# Patient Record
Sex: FEMALE | Race: WHITE
[De-identification: names, ages, dates, MRNs, and addresses within clinical notes are randomized per-mention and may not be internally consistent; named-entity substitution may affect disease eponyms.]

---

## 2017-06-15 PROBLEM — Z00.00 ENCOUNTER FOR PREVENTIVE HEALTH EXAMINATION: Status: ACTIVE | Noted: 2017-06-15

## 2017-06-19 ENCOUNTER — NON-APPOINTMENT (OUTPATIENT)
Age: 72
End: 2017-06-19

## 2017-06-19 ENCOUNTER — APPOINTMENT (OUTPATIENT)
Dept: INTERNAL MEDICINE | Facility: CLINIC | Age: 72
End: 2017-06-19

## 2017-06-19 ENCOUNTER — OTHER (OUTPATIENT)
Age: 72
End: 2017-06-19

## 2017-06-19 VITALS
WEIGHT: 152 LBS | HEART RATE: 82 BPM | DIASTOLIC BLOOD PRESSURE: 78 MMHG | OXYGEN SATURATION: 97 % | BODY MASS INDEX: 26.93 KG/M2 | SYSTOLIC BLOOD PRESSURE: 100 MMHG | HEIGHT: 63 IN | TEMPERATURE: 98.6 F | RESPIRATION RATE: 16 BRPM

## 2017-06-19 DIAGNOSIS — M54.30 SCIATICA, UNSPECIFIED SIDE: ICD-10-CM

## 2017-06-19 DIAGNOSIS — J98.01 ACUTE BRONCHOSPASM: ICD-10-CM

## 2017-06-19 RX ORDER — ASPIRIN 81 MG
81 TABLET, DELAYED RELEASE (ENTERIC COATED) ORAL
Refills: 0 | Status: ACTIVE | COMMUNITY

## 2017-06-20 ENCOUNTER — LABORATORY RESULT (OUTPATIENT)
Age: 72
End: 2017-06-20

## 2017-06-23 ENCOUNTER — OTHER (OUTPATIENT)
Age: 72
End: 2017-06-23

## 2017-07-19 LAB
ALBUMIN SERPL ELPH-MCNC: 4.1 G/DL
ALP BLD-CCNC: 59 U/L
ALT SERPL-CCNC: 27 U/L
ANION GAP SERPL CALC-SCNC: 14 MMOL/L
AST SERPL-CCNC: 26 U/L
BILIRUB DIRECT SERPL-MCNC: 0.1 MG/DL
BILIRUB INDIRECT SERPL-MCNC: 0.1 MG/DL
BILIRUB SERPL-MCNC: 0.2 MG/DL
BUN SERPL-MCNC: 21 MG/DL
CALCIUM SERPL-MCNC: 9.6 MG/DL
CHLORIDE SERPL-SCNC: 105 MMOL/L
CHOLEST SERPL-MCNC: 165 MG/DL
CHOLEST/HDLC SERPL: 3.4 RATIO
CO2 SERPL-SCNC: 27 MMOL/L
CREAT SERPL-MCNC: 1.08 MG/DL
ESTIMATED AVERAGE GLUCOSE: 131 MG/DL
GLUCOSE SERPL-MCNC: 109 MG/DL
HBA1C MFR BLD HPLC: 6.2 %
HDLC SERPL-MCNC: 49 MG/DL
LDLC SERPL CALC-MCNC: 90 MG/DL
POTASSIUM SERPL-SCNC: 5 MMOL/L
PROT SERPL-MCNC: 7.1 G/DL
SODIUM SERPL-SCNC: 146 MMOL/L
T3FREE SERPL-MCNC: 2.61 PG/ML
T3RU NFR SERPL: 1.11 INDEX
T4 FREE SERPL-MCNC: 0.8 NG/DL
T4 SERPL-MCNC: 4.7 UG/DL
TRIGL SERPL-MCNC: 132 MG/DL
TSH SERPL-ACNC: 6.16 UIU/ML

## 2017-11-11 ENCOUNTER — LABORATORY RESULT (OUTPATIENT)
Age: 72
End: 2017-11-11

## 2017-12-19 ENCOUNTER — APPOINTMENT (OUTPATIENT)
Dept: INTERNAL MEDICINE | Facility: CLINIC | Age: 72
End: 2017-12-19
Payer: MEDICARE

## 2017-12-19 VITALS
SYSTOLIC BLOOD PRESSURE: 146 MMHG | HEART RATE: 86 BPM | BODY MASS INDEX: 27.29 KG/M2 | WEIGHT: 154 LBS | TEMPERATURE: 98.1 F | HEIGHT: 63 IN | DIASTOLIC BLOOD PRESSURE: 86 MMHG | OXYGEN SATURATION: 95 % | RESPIRATION RATE: 16 BRPM

## 2017-12-19 DIAGNOSIS — M19.90 UNSPECIFIED OSTEOARTHRITIS, UNSPECIFIED SITE: ICD-10-CM

## 2017-12-19 PROCEDURE — 99214 OFFICE O/P EST MOD 30 MIN: CPT

## 2017-12-19 RX ORDER — SIMVASTATIN 20 MG/1
20 TABLET, FILM COATED ORAL DAILY
Qty: 90 | Refills: 3 | Status: ACTIVE | COMMUNITY
Start: 1900-01-01 | End: 1900-01-01

## 2018-06-19 ENCOUNTER — APPOINTMENT (OUTPATIENT)
Dept: INTERNAL MEDICINE | Facility: CLINIC | Age: 73
End: 2018-06-19
Payer: MEDICARE

## 2018-06-19 VITALS
HEIGHT: 63 IN | OXYGEN SATURATION: 95 % | BODY MASS INDEX: 26.22 KG/M2 | WEIGHT: 148 LBS | DIASTOLIC BLOOD PRESSURE: 82 MMHG | HEART RATE: 88 BPM | SYSTOLIC BLOOD PRESSURE: 134 MMHG | RESPIRATION RATE: 16 BRPM | TEMPERATURE: 98.5 F

## 2018-06-19 DIAGNOSIS — I87.8 OTHER SPECIFIED DISORDERS OF VEINS: ICD-10-CM

## 2018-06-19 DIAGNOSIS — Z78.9 OTHER SPECIFIED HEALTH STATUS: ICD-10-CM

## 2018-06-19 DIAGNOSIS — Z80.42 FAMILY HISTORY OF MALIGNANT NEOPLASM OF PROSTATE: ICD-10-CM

## 2018-06-19 PROCEDURE — G0439: CPT

## 2018-06-19 NOTE — PLAN
[FreeTextEntry1] : 1. Continue with medication as outlined above.\par \par 2. At this time we'll continue to monitor her subclinical hypothyroidism, as she remains totally asymptomatic. She does not want treatment at this time.\par \par 3. Trial of compressive stockings for the edema in her lower extremity on the right. She may have underlying venous insufficiency\par \par 4. The patient will contact the office in July to assess the availability of the shingles vaccine. She will also receive a Prevnar 13 vaccine at the same time.\par \par 5. Followup in 6 months with EKG, routine fasting blood work, and flu shot. A second shingles vaccine will be administered at that time.\par \par 6. GYN followup is scheduled for December.

## 2018-06-19 NOTE — HEALTH RISK ASSESSMENT
[Excellent] : ~his/her~  mood as  excellent [No falls in past year] : Patient reported no falls in the past year [0] : 2) Feeling down, depressed, or hopeless: Not at all (0) [None] : None [With Significant Other] : lives with significant other [Retired] : retired [College] : College [] :  [# Of Children ___] : has [unfilled] children [Feels Safe at Home] : Feels safe at home [Fully functional (bathing, dressing, toileting, transferring, walking, feeding)] : Fully functional (bathing, dressing, toileting, transferring, walking, feeding) [Fully functional (using the telephone, shopping, preparing meals, housekeeping, doing laundry, using] : Fully functional and needs no help or supervision to perform IADLs (using the telephone, shopping, preparing meals, housekeeping, doing laundry, using transportation, managing medications and managing finances) [Smoke Detector] : smoke detector [Carbon Monoxide Detector] : carbon monoxide detector [Seat Belt] :  uses seat belt [Sunscreen] : uses sunscreen [] : No [de-identified] : no [de-identified] : no [Change in mental status noted] : No change in mental status noted [Language] : denies difficulty with language [Behavior] : denies difficulty with behavior [Learning/Retaining New Information] : denies difficulty learning/retaining new information [Handling Complex Tasks] : denies difficulty handling complex tasks [Reasoning] : denies difficulty with reasoning [Spatial Ability and Orientation] : denies difficulty with spatial ability and orientation [Sexually Active] : not sexually active [High Risk Behavior] : no high risk behavior [Reports changes in hearing] : Reports no changes in hearing [Reports changes in vision] : Reports no changes in vision [Reports changes in dental health] : Reports no changes in dental health [Guns at Home] : no guns at home [Safety elements used in home] : no safety elements used in home [Travel to Developing Areas] : does not  travel to developing areas [TB Exposure] : is not being exposed to tuberculosis [Caregiver Concerns] : does not have caregiver concerns [MammogramDate] : na [PapSmearDate] : na [BoneDensityDate] : na [ColonoscopyDate] : na

## 2018-06-19 NOTE — PHYSICAL EXAM
[General Appearance - Alert] : alert [General Appearance - In No Acute Distress] : in no acute distress [Sclera] : the sclera and conjunctiva were normal [PERRL With Normal Accommodation] : pupils were equal in size, round, and reactive to light [Extraocular Movements] : extraocular movements were intact [Outer Ear] : the ears and nose were normal in appearance [Oropharynx] : the oropharynx was normal [Neck Appearance] : the appearance of the neck was normal [Neck Cervical Mass (___cm)] : no neck mass was observed [Jugular Venous Distention Increased] : there was no jugular-venous distention [Thyroid Diffuse Enlargement] : the thyroid was not enlarged [Thyroid Nodule] : there were no palpable thyroid nodules [Auscultation Breath Sounds / Voice Sounds] : lungs were clear to auscultation bilaterally [Heart Rate And Rhythm] : heart rate was normal and rhythm regular [Heart Sounds] : normal S1 and S2 [Heart Sounds Gallop] : no gallops [Murmurs] : no murmurs [Heart Sounds Pericardial Friction Rub] : no pericardial rub [Arterial Pulses Carotid] : carotid pulses were normal with no bruits [Edema] : there was no peripheral edema [Bowel Sounds] : normal bowel sounds [Abdomen Soft] : soft [Abdomen Tenderness] : non-tender [] : no hepato-splenomegaly [Abdomen Mass (___ Cm)] : no abdominal mass palpated [Cervical Lymph Nodes Enlarged Posterior Bilaterally] : posterior cervical [Cervical Lymph Nodes Enlarged Anterior Bilaterally] : anterior cervical [Supraclavicular Lymph Nodes Enlarged Bilaterally] : supraclavicular [No CVA Tenderness] : no ~M costovertebral angle tenderness [Nail Clubbing] : no clubbing  or cyanosis of the fingernails [FreeTextEntry1] : Several hyperpigmented nevi are noted

## 2018-06-19 NOTE — REVIEW OF SYSTEMS
[Joint Swelling] : joint swelling [Negative] : Heme/Lymph [FreeTextEntry9] : Slight swelling of the right ankle

## 2018-06-19 NOTE — HISTORY OF PRESENT ILLNESS
[de-identified] : The patient comes in today for a wellness evaluation.\par \par  Overall, the patient is doing quite well at this time. She states that she "feels great." She has been walking 4 miles per day as part of her exercise regimen. She denies any change in her bowel habits. She has still not yet undergone a colonoscopy. She is scheduled for mammography next week.\par \par The patient was recently evaluated by her cardiologist, Dr. Bell, and carotid duplex studies were performed. Stable from a cardiac standpoint. She denies any other constitutional symptoms at this time such as fevers chills night sweats cough or sputum production. She now comes in for this assessment.

## 2018-08-28 ENCOUNTER — MED ADMIN CHARGE (OUTPATIENT)
Age: 73
End: 2018-08-28

## 2018-08-28 ENCOUNTER — APPOINTMENT (OUTPATIENT)
Dept: INTERNAL MEDICINE | Facility: CLINIC | Age: 73
End: 2018-08-28
Payer: MEDICARE

## 2018-08-28 PROCEDURE — 90750 HZV VACC RECOMBINANT IM: CPT | Mod: GY,GA

## 2018-08-28 PROCEDURE — 90471 IMMUNIZATION ADMIN: CPT | Mod: GY

## 2018-11-13 ENCOUNTER — APPOINTMENT (OUTPATIENT)
Dept: INTERNAL MEDICINE | Facility: CLINIC | Age: 73
End: 2018-11-13
Payer: MEDICARE

## 2018-11-13 ENCOUNTER — MED ADMIN CHARGE (OUTPATIENT)
Age: 73
End: 2018-11-13

## 2018-11-13 ENCOUNTER — RX RENEWAL (OUTPATIENT)
Age: 73
End: 2018-11-13

## 2018-11-13 PROCEDURE — G0009: CPT

## 2018-11-13 PROCEDURE — 90670 PCV13 VACCINE IM: CPT

## 2018-12-11 ENCOUNTER — APPOINTMENT (OUTPATIENT)
Dept: INTERNAL MEDICINE | Facility: CLINIC | Age: 73
End: 2018-12-11
Payer: MEDICARE

## 2018-12-11 VITALS
OXYGEN SATURATION: 96 % | BODY MASS INDEX: 24.27 KG/M2 | WEIGHT: 137 LBS | TEMPERATURE: 97.8 F | RESPIRATION RATE: 18 BRPM | SYSTOLIC BLOOD PRESSURE: 104 MMHG | HEIGHT: 63 IN | DIASTOLIC BLOOD PRESSURE: 76 MMHG | HEART RATE: 66 BPM

## 2018-12-11 DIAGNOSIS — J32.9 CHRONIC SINUSITIS, UNSPECIFIED: ICD-10-CM

## 2018-12-11 DIAGNOSIS — I70.90 UNSPECIFIED ATHEROSCLEROSIS: ICD-10-CM

## 2018-12-11 PROCEDURE — 99214 OFFICE O/P EST MOD 30 MIN: CPT

## 2018-12-11 NOTE — HISTORY OF PRESENT ILLNESS
[de-identified] : The patient comes in today for a followup evaluation. There are several issues to discuss.\par \par Overall, the patient states she is doing well. Monitor her subclinical hypothyroidism. She notes that her energy levels are good. Her appetite is good. She denies having any anxiety or fatigue. The patient has been evaluated by her cardiologist. She is stable from a cardiac standpoint. She continues on simvastatin.\par \par The patient exercises at least 5 days a week by walking. She has lost 11 pounds since the last visit. She has cut back on her portions. She is attempting to be compliant due to the fact that there is a family history of type 2 diabetes.\par \par She denies any sinus type symptoms. The remainder of her review of systems is otherwise noncontributory. She now comes in for this assessment.

## 2018-12-11 NOTE — PLAN
[FreeTextEntry1] : 1. Continued medication as outlined above.\par \par 2. Yearly routine fasting blood work to be performed at this time.\par \par 3. Routine GYN followup in January.\par \par 4. The second shingles vaccine booster will need to be obtained in the near future.\par \par 5. We'll need to discuss followup colonoscopy\par \par 6. Follow up with myself in 6 months with a wellness evaluation. She'll undergo lipid liver profile basic metabolic panel and hemoglobin A1c several days prior to that visit. We'll also update TDaP.

## 2018-12-11 NOTE — PHYSICAL EXAM
[General Appearance - Alert] : alert [General Appearance - In No Acute Distress] : in no acute distress [Outer Ear] : the ears and nose were normal in appearance [Oropharynx] : the oropharynx was normal [Neck Appearance] : the appearance of the neck was normal [Neck Cervical Mass (___cm)] : no neck mass was observed [Jugular Venous Distention Increased] : there was no jugular-venous distention [Thyroid Diffuse Enlargement] : the thyroid was not enlarged [Thyroid Nodule] : there were no palpable thyroid nodules [Auscultation Breath Sounds / Voice Sounds] : lungs were clear to auscultation bilaterally [Heart Rate And Rhythm] : heart rate was normal and rhythm regular [Heart Sounds] : normal S1 and S2 [Heart Sounds Gallop] : no gallops [Murmurs] : no murmurs [Heart Sounds Pericardial Friction Rub] : no pericardial rub [Arterial Pulses Carotid] : carotid pulses were normal with no bruits [Edema] : there was no peripheral edema [Bowel Sounds] : normal bowel sounds [Abdomen Soft] : soft [Abdomen Tenderness] : non-tender [] : no hepato-splenomegaly [Abdomen Mass (___ Cm)] : no abdominal mass palpated [Cervical Lymph Nodes Enlarged Posterior Bilaterally] : posterior cervical [Cervical Lymph Nodes Enlarged Anterior Bilaterally] : anterior cervical [Supraclavicular Lymph Nodes Enlarged Bilaterally] : supraclavicular [FreeTextEntry1] : Several hyperpigmented nevi are noted

## 2018-12-14 ENCOUNTER — LABORATORY RESULT (OUTPATIENT)
Age: 73
End: 2018-12-14

## 2018-12-17 LAB
24R-OH-CALCIDIOL SERPL-MCNC: 28.2 PG/ML
ALBUMIN SERPL ELPH-MCNC: 4.4 G/DL
ALP BLD-CCNC: 57 U/L
ALT SERPL-CCNC: 31 U/L
ANION GAP SERPL CALC-SCNC: 10 MMOL/L
APPEARANCE: CLEAR
AST SERPL-CCNC: 29 U/L
BACTERIA: NEGATIVE
BASOPHILS # BLD AUTO: 0.02 K/UL
BASOPHILS NFR BLD AUTO: 0.4 %
BILIRUB SERPL-MCNC: <0.2 MG/DL
BILIRUBIN URINE: NEGATIVE
BLOOD URINE: NEGATIVE
BUN SERPL-MCNC: 28 MG/DL
CALCIUM SERPL-MCNC: 9.3 MG/DL
CHLORIDE SERPL-SCNC: 107 MMOL/L
CHOLEST SERPL-MCNC: 138 MG/DL
CHOLEST/HDLC SERPL: 3 RATIO
CO2 SERPL-SCNC: 27 MMOL/L
COLOR: YELLOW
CREAT SERPL-MCNC: 0.95 MG/DL
EOSINOPHIL # BLD AUTO: 0.27 K/UL
EOSINOPHIL NFR BLD AUTO: 5 %
ESTIMATED AVERAGE GLUCOSE: 126 MG/DL
GLUCOSE QUALITATIVE U: NEGATIVE MG/DL
GLUCOSE SERPL-MCNC: 104 MG/DL
HBA1C MFR BLD HPLC: 6 %
HCT VFR BLD CALC: 39.8 %
HDLC SERPL-MCNC: 46 MG/DL
HGB BLD-MCNC: 12.6 G/DL
HYALINE CASTS: 1 /LPF
IMM GRANULOCYTES NFR BLD AUTO: 0.2 %
KETONES URINE: NEGATIVE
LDLC SERPL CALC-MCNC: 74 MG/DL
LEUKOCYTE ESTERASE URINE: ABNORMAL
LYMPHOCYTES # BLD AUTO: 2.46 K/UL
LYMPHOCYTES NFR BLD AUTO: 45.9 %
MAN DIFF?: NORMAL
MCHC RBC-ENTMCNC: 28.3 PG
MCHC RBC-ENTMCNC: 31.7 GM/DL
MCV RBC AUTO: 89.4 FL
MICROSCOPIC-UA: NORMAL
MONOCYTES # BLD AUTO: 0.5 K/UL
MONOCYTES NFR BLD AUTO: 9.3 %
NEUTROPHILS # BLD AUTO: 2.1 K/UL
NEUTROPHILS NFR BLD AUTO: 39.2 %
NITRITE URINE: NEGATIVE
PH URINE: 5
PLATELET # BLD AUTO: 273 K/UL
POTASSIUM SERPL-SCNC: 4.4 MMOL/L
PROT SERPL-MCNC: 6.8 G/DL
PROTEIN URINE: NEGATIVE MG/DL
RBC # BLD: 4.45 M/UL
RBC # FLD: 13.6 %
RED BLOOD CELLS URINE: 1 /HPF
SODIUM SERPL-SCNC: 144 MMOL/L
SPECIFIC GRAVITY URINE: 1.02
SQUAMOUS EPITHELIAL CELLS: 1 /HPF
T3FREE SERPL-MCNC: 2.45 PG/ML
T3RU NFR SERPL: 1.16 INDEX
T4 FREE SERPL-MCNC: 0.8 NG/DL
T4 SERPL-MCNC: 4.5 UG/DL
TRIGL SERPL-MCNC: 90 MG/DL
TSH SERPL-ACNC: 12.59 UIU/ML
UROBILINOGEN URINE: NEGATIVE MG/DL
WBC # FLD AUTO: 5.36 K/UL
WHITE BLOOD CELLS URINE: 3 /HPF

## 2019-01-04 ENCOUNTER — APPOINTMENT (OUTPATIENT)
Dept: INTERNAL MEDICINE | Facility: CLINIC | Age: 74
End: 2019-01-04
Payer: MEDICARE

## 2019-01-04 VITALS
TEMPERATURE: 97.5 F | WEIGHT: 138 LBS | HEIGHT: 63 IN | SYSTOLIC BLOOD PRESSURE: 118 MMHG | HEART RATE: 80 BPM | RESPIRATION RATE: 16 BRPM | BODY MASS INDEX: 24.45 KG/M2 | DIASTOLIC BLOOD PRESSURE: 64 MMHG | OXYGEN SATURATION: 99 %

## 2019-01-04 PROCEDURE — 99213 OFFICE O/P EST LOW 20 MIN: CPT

## 2019-01-04 NOTE — PHYSICAL EXAM
[Normal Outer Ear/Nose] : the outer ears and nose were normal in appearance [Normal Oropharynx] : the oropharynx was normal [Normal TMs] : both tympanic membranes were normal [Supple] : supple [No Lymphadenopathy] : no lymphadenopathy [No Respiratory Distress] : no respiratory distress  [Clear to Auscultation] : lungs were clear to auscultation bilaterally [No Accessory Muscle Use] : no accessory muscle use [Normal Rate] : normal rate  [Regular Rhythm] : with a regular rhythm [Normal S1, S2] : normal S1 and S2 [Pedal Pulses Present] : the pedal pulses are present [No Extremity Clubbing/Cyanosis] : no extremity clubbing/cyanosis [Normal Posterior Cervical Nodes] : no posterior cervical lymphadenopathy [Normal Anterior Cervical Nodes] : no anterior cervical lymphadenopathy [Normal Gait] : normal gait [Coordination Grossly Intact] : coordination grossly intact [No Focal Deficits] : no focal deficits [Normal Affect] : the affect was normal [Alert and Oriented x3] : oriented to person, place, and time [Normal Insight/Judgement] : insight and judgment were intact [Well Nourished] : well nourished [Well Developed] : well developed [No Acute Distress] : no acute distress [de-identified] : left side thoracic back with 2  superficial skin tears,  skin mildy reddened

## 2019-01-04 NOTE — REVIEW OF SYSTEMS
[Itching] : Itching [Skin Rash] : skin rash [Negative] : ENT [Fever] : no fever [Chills] : no chills [Fatigue] : no fatigue [de-identified] : see HPI

## 2019-01-04 NOTE — ASSESSMENT
[FreeTextEntry1] : ? dermatitis, itchy skin \par pt has Triamcinolone ointment at home , will use apply to affected area TID PRN\par if no resolve of symptoms or worsens f/up with Dermatologist \par

## 2019-01-16 ENCOUNTER — APPOINTMENT (RX ONLY)
Dept: URBAN - METROPOLITAN AREA CLINIC 148 | Facility: CLINIC | Age: 74
Setting detail: DERMATOLOGY
End: 2019-01-16

## 2019-01-16 DIAGNOSIS — L82.0 INFLAMED SEBORRHEIC KERATOSIS: ICD-10-CM

## 2019-01-16 DIAGNOSIS — R20.2 PARESTHESIA OF SKIN: ICD-10-CM

## 2019-01-16 PROCEDURE — ? COUNSELING

## 2019-01-16 PROCEDURE — ? LIQUID NITROGEN

## 2019-01-16 PROCEDURE — 99202 OFFICE O/P NEW SF 15 MIN: CPT | Mod: 25

## 2019-01-16 PROCEDURE — 17110 DESTRUCTION B9 LES UP TO 14: CPT

## 2019-01-16 PROCEDURE — ? ORDER TESTS

## 2019-01-16 ASSESSMENT — LOCATION SIMPLE DESCRIPTION DERM
LOCATION SIMPLE: LEFT UPPER BACK
LOCATION SIMPLE: ABDOMEN
LOCATION SIMPLE: UPPER BACK
LOCATION SIMPLE: RIGHT UPPER BACK
LOCATION SIMPLE: LEFT LOWER BACK

## 2019-01-16 ASSESSMENT — LOCATION DETAILED DESCRIPTION DERM
LOCATION DETAILED: INFERIOR THORACIC SPINE
LOCATION DETAILED: LEFT MEDIAL UPPER BACK
LOCATION DETAILED: RIGHT INFERIOR UPPER BACK
LOCATION DETAILED: RIGHT MEDIAL UPPER BACK
LOCATION DETAILED: LEFT SUPERIOR LATERAL MIDBACK
LOCATION DETAILED: LEFT RIB CAGE
LOCATION DETAILED: RIGHT MID-UPPER BACK
LOCATION DETAILED: LEFT INFERIOR UPPER BACK

## 2019-01-16 ASSESSMENT — LOCATION ZONE DERM: LOCATION ZONE: TRUNK

## 2019-01-16 NOTE — PROCEDURE: LIQUID NITROGEN
Medical Necessity Information: It is in your best interest to select a reason for this procedure from the list below. All of these items fulfill various CMS LCD requirements except the new and changing color options.
Medical Necessity Clause: This procedure was medically necessary because the lesions that were treated were:
Render Post-Care Instructions In Note?: no
Number Of Freeze-Thaw Cycles: 2 freeze-thaw cycles
Detail Level: Zone
Duration Of Freeze Thaw-Cycle (Seconds): 5-15
Post-Care Instructions: I reviewed with the patient in detail post-care instructions. Patient is to wear sunprotection, and avoid picking at any of the treated lesions. Pt may apply Vaseline to crusted or scabbing areas.
Consent: The patient's consent was obtained including but not limited to risks of crusting, scabbing, blistering, scarring, darker or lighter pigmentary change, recurrence, incomplete removal and infection.

## 2019-01-31 ENCOUNTER — RX RENEWAL (OUTPATIENT)
Age: 74
End: 2019-01-31

## 2019-01-31 RX ORDER — LEVOTHYROXINE SODIUM 0.03 MG/1
25 TABLET ORAL DAILY
Qty: 60 | Refills: 2 | Status: DISCONTINUED | COMMUNITY
End: 2019-01-31

## 2019-03-06 ENCOUNTER — APPOINTMENT (RX ONLY)
Dept: URBAN - METROPOLITAN AREA CLINIC 148 | Facility: CLINIC | Age: 74
Setting detail: DERMATOLOGY
End: 2019-03-06

## 2019-03-06 DIAGNOSIS — L82.0 INFLAMED SEBORRHEIC KERATOSIS: ICD-10-CM

## 2019-03-06 DIAGNOSIS — R20.2 PARESTHESIA OF SKIN: ICD-10-CM | Status: RESOLVED

## 2019-03-06 PROCEDURE — ? ADDITIONAL NOTES

## 2019-03-06 PROCEDURE — 17110 DESTRUCTION B9 LES UP TO 14: CPT

## 2019-03-06 PROCEDURE — ? INVENTORY

## 2019-03-06 PROCEDURE — 99213 OFFICE O/P EST LOW 20 MIN: CPT | Mod: 25

## 2019-03-06 PROCEDURE — ? COUNSELING

## 2019-03-06 PROCEDURE — ? LIQUID NITROGEN

## 2019-03-06 ASSESSMENT — LOCATION ZONE DERM: LOCATION ZONE: TRUNK

## 2019-03-06 ASSESSMENT — LOCATION DETAILED DESCRIPTION DERM
LOCATION DETAILED: LEFT MID-UPPER BACK
LOCATION DETAILED: LEFT SUPERIOR LATERAL MIDBACK
LOCATION DETAILED: LEFT RIB CAGE

## 2019-03-06 ASSESSMENT — LOCATION SIMPLE DESCRIPTION DERM
LOCATION SIMPLE: ABDOMEN
LOCATION SIMPLE: LEFT UPPER BACK
LOCATION SIMPLE: LEFT LOWER BACK

## 2019-03-06 NOTE — PROCEDURE: LIQUID NITROGEN
Consent: The patient's consent was obtained including but not limited to risks of crusting, scabbing, blistering, scarring, darker or lighter pigmentary change, recurrence, incomplete removal and infection.
Detail Level: Zone
Include Z78.9 (Other Specified Conditions Influencing Health Status) As An Associated Diagnosis?: No
Number Of Freeze-Thaw Cycles: 2 freeze-thaw cycles
Medical Necessity Information: It is in your best interest to select a reason for this procedure from the list below. All of these items fulfill various CMS LCD requirements except the new and changing color options.
Medical Necessity Clause: This procedure was medically necessary because the lesions that were treated were:
Post-Care Instructions: I reviewed with the patient in detail post-care instructions. Patient is to wear sunprotection, and avoid picking at any of the treated lesions. Pt may apply Vaseline to crusted or scabbing areas.
Duration Of Freeze Thaw-Cycle (Seconds): 5-15

## 2019-04-25 ENCOUNTER — RX RENEWAL (OUTPATIENT)
Age: 74
End: 2019-04-25

## 2019-06-20 ENCOUNTER — LABORATORY RESULT (OUTPATIENT)
Age: 74
End: 2019-06-20

## 2019-06-24 LAB — TSH SERPL-ACNC: 3.58 UIU/ML

## 2019-06-25 ENCOUNTER — APPOINTMENT (OUTPATIENT)
Dept: INTERNAL MEDICINE | Facility: CLINIC | Age: 74
End: 2019-06-25
Payer: MEDICARE

## 2019-06-25 VITALS
WEIGHT: 147 LBS | BODY MASS INDEX: 26.05 KG/M2 | HEIGHT: 63 IN | TEMPERATURE: 97.4 F | RESPIRATION RATE: 18 BRPM | SYSTOLIC BLOOD PRESSURE: 124 MMHG | DIASTOLIC BLOOD PRESSURE: 80 MMHG | OXYGEN SATURATION: 96 % | HEART RATE: 80 BPM

## 2019-06-25 DIAGNOSIS — Z23 ENCOUNTER FOR IMMUNIZATION: ICD-10-CM

## 2019-06-25 PROCEDURE — G0439: CPT

## 2019-06-25 PROCEDURE — 90715 TDAP VACCINE 7 YRS/> IM: CPT | Mod: GY

## 2019-06-25 PROCEDURE — 90471 IMMUNIZATION ADMIN: CPT | Mod: GY

## 2019-06-25 NOTE — HEALTH RISK ASSESSMENT
[No] : No [Retired] : retired [Smoke Detector] : smoke detector [Carbon Monoxide Detector] : carbon monoxide detector [Safety elements used in home] : safety elements used in home [Seat Belt] :  uses seat belt [Sunscreen] : uses sunscreen [] : No [Guns at Home] : no guns at home [MammogramDate] : 06/19 [PapSmearDate] : 12/18 [BoneDensityDate] : 12/18

## 2019-06-25 NOTE — HISTORY OF PRESENT ILLNESS
[de-identified] : Patient comes in today for a wellness evaluation.\par \par At this time, the patient states that she is relatively stable. She continues to exercise by walking 4 miles every day, 7 days a week. She unfortunately, gained 10 pounds while she was in [place] because she did not go to [employer]. She'll attempt to lose that weight once again.\par \par The patient has been tolerating the Synthroid at a dose of 50 mcg daily. She denies any palpitations or intolerance to temperature. He appetite is good. She still has not yet gone for colonoscopy which has been recommended on multiple occasions. She was seen in this office in January for low back pain. She underwent x-rays in [place] which showed degenerative changes. The back pain subsequently resolved on its own. She did not undergo any further workup or evaluation. She has been seen by GYN, ophthalmology, and to dermatology. She denies any other acute constitutional symptoms at this time. She not been for this assessment

## 2019-06-25 NOTE — PLAN
[FreeTextEntry1] : 1. Continue with medication as outlined above.\par \par 2. Patient will attempt to engage with weight loss once again attempt to lose the 10 pounds that she gained in [place].\par \par 3. TDaP has been updated\par \par 4. Colonoscopy still needs to be performed as she continues to put this off.\par \par 5. Followup in 6 months with office visit and blood pressure check. She'll undergo yearly routine fasting blood work several days prior to that visit.

## 2019-06-25 NOTE — PHYSICAL EXAM
[Normal Affect] : the affect was normal [Normal Insight/Judgement] : insight and judgment were intact [General Appearance - Alert] : alert [General Appearance - In No Acute Distress] : in no acute distress [Sclera] : the sclera and conjunctiva were normal [PERRL With Normal Accommodation] : pupils were equal in size, round, and reactive to light [Oropharynx] : the oropharynx was normal [Outer Ear] : the ears and nose were normal in appearance [Extraocular Movements] : extraocular movements were intact [Neck Appearance] : the appearance of the neck was normal [Neck Cervical Mass (___cm)] : no neck mass was observed [Jugular Venous Distention Increased] : there was no jugular-venous distention [Thyroid Diffuse Enlargement] : the thyroid was not enlarged [Thyroid Nodule] : there were no palpable thyroid nodules [Auscultation Breath Sounds / Voice Sounds] : lungs were clear to auscultation bilaterally [Heart Sounds] : normal S1 and S2 [Heart Sounds Gallop] : no gallops [Heart Rate And Rhythm] : heart rate was normal and rhythm regular [Murmurs] : no murmurs [Heart Sounds Pericardial Friction Rub] : no pericardial rub [Arterial Pulses Carotid] : carotid pulses were normal with no bruits [Edema] : there was no peripheral edema [Bowel Sounds] : normal bowel sounds [Abdomen Soft] : soft [Abdomen Mass (___ Cm)] : no abdominal mass palpated [Abdomen Tenderness] : non-tender [] : no hepato-splenomegaly [Supraclavicular Lymph Nodes Enlarged Bilaterally] : supraclavicular [Cervical Lymph Nodes Enlarged Posterior Bilaterally] : posterior cervical [Cervical Lymph Nodes Enlarged Anterior Bilaterally] : anterior cervical [No CVA Tenderness] : no ~M costovertebral angle tenderness [Nail Clubbing] : no clubbing  or cyanosis of the fingernails [FreeTextEntry1] : Several hyperpigmented nevi are noted

## 2019-07-22 ENCOUNTER — MEDICATION RENEWAL (OUTPATIENT)
Age: 74
End: 2019-07-22

## 2019-08-01 ENCOUNTER — MEDICATION RENEWAL (OUTPATIENT)
Age: 74
End: 2019-08-01

## 2019-12-05 ENCOUNTER — LABORATORY RESULT (OUTPATIENT)
Age: 74
End: 2019-12-05

## 2019-12-06 LAB
25(OH)D3 SERPL-MCNC: 38.3 NG/ML
ALBUMIN SERPL ELPH-MCNC: 4.3 G/DL
ALP BLD-CCNC: 67 U/L
ALT SERPL-CCNC: 44 U/L
ANION GAP SERPL CALC-SCNC: 13 MMOL/L
APPEARANCE: CLEAR
AST SERPL-CCNC: 30 U/L
BACTERIA: NEGATIVE
BASOPHILS # BLD AUTO: 0.03 K/UL
BASOPHILS NFR BLD AUTO: 0.6 %
BILIRUB SERPL-MCNC: 0.2 MG/DL
BILIRUBIN URINE: NEGATIVE
BLOOD URINE: NEGATIVE
BUN SERPL-MCNC: 18 MG/DL
CALCIUM SERPL-MCNC: 9.2 MG/DL
CHLORIDE SERPL-SCNC: 107 MMOL/L
CHOLEST SERPL-MCNC: 154 MG/DL
CHOLEST/HDLC SERPL: 3.5 RATIO
CO2 SERPL-SCNC: 25 MMOL/L
COLOR: NORMAL
CREAT SERPL-MCNC: 0.91 MG/DL
EOSINOPHIL # BLD AUTO: 0.15 K/UL
EOSINOPHIL NFR BLD AUTO: 3.1 %
ESTIMATED AVERAGE GLUCOSE: 131 MG/DL
GLUCOSE QUALITATIVE U: NEGATIVE
GLUCOSE SERPL-MCNC: 101 MG/DL
HBA1C MFR BLD HPLC: 6.2 %
HCT VFR BLD CALC: 40.4 %
HDLC SERPL-MCNC: 44 MG/DL
HGB BLD-MCNC: 12.8 G/DL
HYALINE CASTS: 0 /LPF
IMM GRANULOCYTES NFR BLD AUTO: 0.4 %
KETONES URINE: NEGATIVE
LDLC SERPL CALC-MCNC: 83 MG/DL
LEUKOCYTE ESTERASE URINE: NEGATIVE
LYMPHOCYTES # BLD AUTO: 1.94 K/UL
LYMPHOCYTES NFR BLD AUTO: 40.4 %
MAN DIFF?: NORMAL
MCHC RBC-ENTMCNC: 28.3 PG
MCHC RBC-ENTMCNC: 31.7 GM/DL
MCV RBC AUTO: 89.4 FL
MICROSCOPIC-UA: NORMAL
MONOCYTES # BLD AUTO: 0.59 K/UL
MONOCYTES NFR BLD AUTO: 12.3 %
NEUTROPHILS # BLD AUTO: 2.07 K/UL
NEUTROPHILS NFR BLD AUTO: 43.2 %
NITRITE URINE: NEGATIVE
PH URINE: 5.5
PLATELET # BLD AUTO: 265 K/UL
POTASSIUM SERPL-SCNC: 4.8 MMOL/L
PROT SERPL-MCNC: 6.6 G/DL
PROTEIN URINE: NEGATIVE
RBC # BLD: 4.52 M/UL
RBC # FLD: 13.7 %
RED BLOOD CELLS URINE: 1 /HPF
SODIUM SERPL-SCNC: 145 MMOL/L
SPECIFIC GRAVITY URINE: 1.01
SQUAMOUS EPITHELIAL CELLS: 9 /HPF
T3FREE SERPL-MCNC: 2.23 PG/ML
T3RU NFR SERPL: 1.1 TBI
T4 FREE SERPL-MCNC: 1 NG/DL
T4 SERPL-MCNC: 5.1 UG/DL
TRIGL SERPL-MCNC: 137 MG/DL
TSH SERPL-ACNC: 2.76 UIU/ML
UROBILINOGEN URINE: NORMAL
WBC # FLD AUTO: 4.8 K/UL
WHITE BLOOD CELLS URINE: 4 /HPF

## 2019-12-09 ENCOUNTER — APPOINTMENT (OUTPATIENT)
Dept: INTERNAL MEDICINE | Facility: CLINIC | Age: 74
End: 2019-12-09
Payer: MEDICARE

## 2019-12-09 VITALS
OXYGEN SATURATION: 98 % | HEIGHT: 63 IN | SYSTOLIC BLOOD PRESSURE: 118 MMHG | HEART RATE: 92 BPM | RESPIRATION RATE: 16 BRPM | DIASTOLIC BLOOD PRESSURE: 78 MMHG | TEMPERATURE: 98 F | BODY MASS INDEX: 26.93 KG/M2 | WEIGHT: 152 LBS

## 2019-12-09 DIAGNOSIS — M79.10 MYALGIA, UNSPECIFIED SITE: ICD-10-CM

## 2019-12-09 DIAGNOSIS — R73.03 PREDIABETES.: ICD-10-CM

## 2019-12-09 DIAGNOSIS — E03.9 HYPOTHYROIDISM, UNSPECIFIED: ICD-10-CM

## 2019-12-09 DIAGNOSIS — M25.50 PAIN IN UNSPECIFIED JOINT: ICD-10-CM

## 2019-12-09 DIAGNOSIS — E78.00 PURE HYPERCHOLESTEROLEMIA, UNSPECIFIED: ICD-10-CM

## 2019-12-09 PROCEDURE — 99214 OFFICE O/P EST MOD 30 MIN: CPT

## 2019-12-09 NOTE — ASSESSMENT
[FreeTextEntry1] : \par Will add serologies to recent BW.  Doubt symptoms due to statin Patient agrees and declines trial off meds.  \par \par Trial advil for symptoms.  \par \par Await BW.  Consider rheum eval  if persist.  \par \par Long discussion focusing on benefits of  Lifestyle modifications.  Encouraged healthy diet with lean meats, fish, chicken,increased fruits and veggies, higher fiber and avoidance of sweets, soft drinks and fruit juices.   Discussed  portion control  and importance of  increased exercise for health promotion and disease prevention. Calorie restriction with 500 -1000 shira necessary to promote weight loss of 1-2 lbs weekly.\par \par \par Colonoscopy again stressed.  \par \par FU 6 months for CPE.

## 2019-12-09 NOTE — HISTORY OF PRESENT ILLNESS
[FreeTextEntry1] : 6 month FU/review BW [de-identified] : Presents for  FU.  Doing well Overall however for the past 3 months she is experiencing intermittent arthralgias and myalgias.  At times she has swelling and stiffness in fingers and hands with genralized myalgias.    Denies fevers or rash.   No self treatment.   NO prior symptoms.  She  remains active and walks 4 miles daily weather permitting and does 10-16,000 steps daily.  Avoids exercise however when symptomatic.     [place]

## 2019-12-12 LAB
ANA SER IF-ACNC: NEGATIVE
B BURGDOR IGG+IGM SER QL IB: NORMAL
CK SERPL-CCNC: 338 U/L
CRP SERPL-MCNC: <0.1 MG/DL
RHEUMATOID FACT SER QL: <10 IU/ML

## 2020-05-18 DIAGNOSIS — Z12.31 ENCOUNTER FOR SCREENING MAMMOGRAM FOR MALIGNANT NEOPLASM OF BREAST: ICD-10-CM

## 2020-11-18 ENCOUNTER — LABORATORY RESULT (OUTPATIENT)
Age: 75
End: 2020-11-18

## 2020-11-19 LAB
25(OH)D3 SERPL-MCNC: 30.7 NG/ML
ALBUMIN SERPL ELPH-MCNC: 4.2 G/DL
ALP BLD-CCNC: 65 U/L
ALT SERPL-CCNC: 31 U/L
ANION GAP SERPL CALC-SCNC: 10 MMOL/L
APPEARANCE: CLEAR
AST SERPL-CCNC: 24 U/L
BACTERIA: NEGATIVE
BASOPHILS # BLD AUTO: 0.03 K/UL
BASOPHILS NFR BLD AUTO: 0.5 %
BILIRUB SERPL-MCNC: 0.3 MG/DL
BILIRUBIN URINE: NEGATIVE
BLOOD URINE: NEGATIVE
BUN SERPL-MCNC: 18 MG/DL
CALCIUM SERPL-MCNC: 9.3 MG/DL
CHLORIDE SERPL-SCNC: 107 MMOL/L
CHOLEST SERPL-MCNC: 150 MG/DL
CO2 SERPL-SCNC: 25 MMOL/L
COLOR: NORMAL
CREAT SERPL-MCNC: 0.89 MG/DL
EOSINOPHIL # BLD AUTO: 0.24 K/UL
EOSINOPHIL NFR BLD AUTO: 4.2 %
ESTIMATED AVERAGE GLUCOSE: 131 MG/DL
GLUCOSE QUALITATIVE U: NEGATIVE
GLUCOSE SERPL-MCNC: 104 MG/DL
HBA1C MFR BLD HPLC: 6.2 %
HCT VFR BLD CALC: 39.1 %
HDLC SERPL-MCNC: 41 MG/DL
HGB BLD-MCNC: 12.6 G/DL
HYALINE CASTS: 1 /LPF
IMM GRANULOCYTES NFR BLD AUTO: 0.4 %
KETONES URINE: NEGATIVE
LDLC SERPL CALC-MCNC: 78 MG/DL
LEUKOCYTE ESTERASE URINE: NEGATIVE
LYMPHOCYTES # BLD AUTO: 2.43 K/UL
LYMPHOCYTES NFR BLD AUTO: 42.7 %
MAN DIFF?: NORMAL
MCHC RBC-ENTMCNC: 28.5 PG
MCHC RBC-ENTMCNC: 32.2 GM/DL
MCV RBC AUTO: 88.5 FL
MICROSCOPIC-UA: NORMAL
MONOCYTES # BLD AUTO: 0.6 K/UL
MONOCYTES NFR BLD AUTO: 10.5 %
NEUTROPHILS # BLD AUTO: 2.37 K/UL
NEUTROPHILS NFR BLD AUTO: 41.7 %
NITRITE URINE: NEGATIVE
NONHDLC SERPL-MCNC: 109 MG/DL
PH URINE: 5.5
PLATELET # BLD AUTO: 252 K/UL
POTASSIUM SERPL-SCNC: 4.6 MMOL/L
PROT SERPL-MCNC: 6.2 G/DL
PROTEIN URINE: NEGATIVE
RBC # BLD: 4.42 M/UL
RBC # FLD: 13.4 %
RED BLOOD CELLS URINE: 1 /HPF
SODIUM SERPL-SCNC: 142 MMOL/L
SPECIFIC GRAVITY URINE: 1.02
SQUAMOUS EPITHELIAL CELLS: 1 /HPF
T3FREE SERPL-MCNC: 2.46 PG/ML
T3RU NFR SERPL: 1.1 TBI
T4 FREE SERPL-MCNC: 1 NG/DL
T4 SERPL-MCNC: 5.9 UG/DL
TRIGL SERPL-MCNC: 153 MG/DL
TSH SERPL-ACNC: 3.75 UIU/ML
UROBILINOGEN URINE: NORMAL
WBC # FLD AUTO: 5.69 K/UL
WHITE BLOOD CELLS URINE: 2 /HPF

## 2020-11-20 ENCOUNTER — APPOINTMENT (OUTPATIENT)
Dept: INTERNAL MEDICINE | Facility: CLINIC | Age: 75
End: 2020-11-20
Payer: MEDICARE

## 2020-11-20 VITALS
HEIGHT: 63 IN | SYSTOLIC BLOOD PRESSURE: 138 MMHG | HEART RATE: 81 BPM | BODY MASS INDEX: 26.93 KG/M2 | TEMPERATURE: 97.9 F | WEIGHT: 152 LBS | RESPIRATION RATE: 18 BRPM | OXYGEN SATURATION: 95 % | DIASTOLIC BLOOD PRESSURE: 82 MMHG

## 2020-11-20 DIAGNOSIS — Z00.00 ENCOUNTER FOR GENERAL ADULT MEDICAL EXAMINATION W/OUT ABNORMAL FINDINGS: ICD-10-CM

## 2020-11-20 PROCEDURE — G0439: CPT

## 2020-11-20 NOTE — PLAN
[FreeTextEntry1] : 1. Continue with medication as outlined above.\par \par 2. The patient has been referred to Dr. Schwartz for a dermatologic evaluation regarding her hair loss.\par \par 3. The patient still needs to undergo her followup surveillance colonoscopy.\par \par 4. Routine GYN followup\par \par 5. Followup in one year with a wellness evaluation and yearly routine blood work\par \par 6. Maintain current cardiovascular exercise regimen.

## 2020-11-20 NOTE — HISTORY OF PRESENT ILLNESS
[de-identified] : The patient comes in today for a wellness evaluation.\par \par Overall, the patient has been doing extremely well. Her major complaint, has been that of thinning hair. She feels as if her has been coming out more noticeably in the past she has been compliant with her thyroid replacement medication. She does not miss any doses. She has been using over-the-counter vitamins contain biotin, as well as certain types of shampoo.\par \par The patient exercises on a regular basis by walking 4 miles several times a week. She denies any chest pains or breathlessness. She was seen by her cardiologist, , who performed a workup. She underwent an echocardiogram as well as a nuclear stress test. The studies were unremarkable.\par \par The patient notes her appetite is good. She eats fairly healthy. She has not undergone a colonoscopy yet. She is due for her gynecologic assessment next month. She now comes in for this  assessment.

## 2020-11-20 NOTE — REVIEW OF SYSTEMS
[Hair Changes] : hair changes [Negative] : Heme/Lymph [de-identified] : see history of present illness

## 2020-11-20 NOTE — HEALTH RISK ASSESSMENT
[No] : In the past 12 months have you used drugs other than those required for medical reasons? No [0] : 2) Feeling down, depressed, or hopeless: Not at all (0) [Retired] : retired [Smoke Detector] : smoke detector [Carbon Monoxide Detector] : carbon monoxide detector [Safety elements used in home] : safety elements used in home [Seat Belt] :  uses seat belt [Sunscreen] : uses sunscreen [] : No [Guns at Home] : no guns at home [MammogramDate] : 09/20 [BoneDensityDate] : 07/20

## 2021-03-31 ENCOUNTER — RX RENEWAL (OUTPATIENT)
Age: 76
End: 2021-03-31

## 2021-06-24 ENCOUNTER — RX RENEWAL (OUTPATIENT)
Age: 76
End: 2021-06-24

## 2021-07-13 ENCOUNTER — RX RENEWAL (OUTPATIENT)
Age: 76
End: 2021-07-13

## 2021-10-05 ENCOUNTER — RX RENEWAL (OUTPATIENT)
Age: 76
End: 2021-10-05

## 2021-10-05 RX ORDER — LEVOTHYROXINE SODIUM 0.05 MG/1
50 TABLET ORAL
Qty: 90 | Refills: 0 | Status: ACTIVE | COMMUNITY
Start: 2019-01-31 | End: 1900-01-01

## 2023-09-11 NOTE — PROCEDURE: ADDITIONAL NOTES
Additional Notes: X-rays showed multilevel degenerative disc disease, cervical spondylosis, endplate spurring and osteophyte formation. Consider further work up if symptoms return.
Detail Level: Zone
71

## 2024-01-24 ENCOUNTER — APPOINTMENT (RX ONLY)
Dept: URBAN - METROPOLITAN AREA CLINIC 333 | Facility: CLINIC | Age: 79
Setting detail: DERMATOLOGY
End: 2024-01-24

## 2024-01-24 DIAGNOSIS — L82.0 INFLAMED SEBORRHEIC KERATOSIS: ICD-10-CM | Status: INADEQUATELY CONTROLLED

## 2024-01-24 DIAGNOSIS — L82.1 OTHER SEBORRHEIC KERATOSIS: ICD-10-CM | Status: STABLE

## 2024-01-24 DIAGNOSIS — L72.8 OTHER FOLLICULAR CYSTS OF THE SKIN AND SUBCUTANEOUS TISSUE: ICD-10-CM | Status: INADEQUATELY CONTROLLED

## 2024-01-24 PROCEDURE — 99202 OFFICE O/P NEW SF 15 MIN: CPT | Mod: 25

## 2024-01-24 PROCEDURE — ? ADDITIONAL NOTES

## 2024-01-24 PROCEDURE — ? LIQUID NITROGEN

## 2024-01-24 PROCEDURE — ? COUNSELING

## 2024-01-24 PROCEDURE — 17110 DESTRUCTION B9 LES UP TO 14: CPT

## 2024-01-24 ASSESSMENT — LOCATION DETAILED DESCRIPTION DERM
LOCATION DETAILED: LEFT LATERAL SUPERIOR CHEST
LOCATION DETAILED: LEFT CLAVICULAR SKIN
LOCATION DETAILED: RIGHT MEDIAL SUPERIOR CHEST
LOCATION DETAILED: LEFT MEDIAL SUPERIOR CHEST
LOCATION DETAILED: RIGHT INFERIOR LATERAL NECK
LOCATION DETAILED: UPPER STERNUM
LOCATION DETAILED: RIGHT CENTRAL MALAR CHEEK
LOCATION DETAILED: RIGHT CLAVICULAR SKIN

## 2024-01-24 ASSESSMENT — LOCATION ZONE DERM
LOCATION ZONE: NECK
LOCATION ZONE: FACE
LOCATION ZONE: TRUNK

## 2024-01-24 ASSESSMENT — LOCATION SIMPLE DESCRIPTION DERM
LOCATION SIMPLE: RIGHT CLAVICULAR SKIN
LOCATION SIMPLE: CHEST
LOCATION SIMPLE: RIGHT CHEEK
LOCATION SIMPLE: RIGHT ANTERIOR NECK
LOCATION SIMPLE: LEFT CLAVICULAR SKIN

## 2024-01-24 NOTE — PROCEDURE: LIQUID NITROGEN
Medical Necessity Clause: This procedure was medically necessary because the lesions that were treated were:
Render Note In Bullet Format When Appropriate: No
Medical Necessity Information: It is in your best interest to select a reason for this procedure from the list below. All of these items fulfill various CMS LCD requirements except the new and changing color options.
Show Aperture Variable?: Yes
Duration Of Freeze Thaw-Cycle (Seconds): 5-10
Detail Level: Detailed
Spray Paint Text: The liquid nitrogen was applied to the skin utilizing a spray paint frosting technique.
Post-Care Instructions: Aftercare Cryosurgery\\nYou have just had cryotherapy for the treatment of benign (non-cancerous) skin lesions. You can expect the pain to rapidly decrease over the next 45 minutes. The area treated will initially turn red and over the next 72 hours turn brown to black. A scab will form that will peel off by itself within 5 to 7 days. A blister or reddish-purple blood blister may form where the area has been treated.
Consent: The patient's consent was obtained including but not limited to risks of crusting, scabbing, blistering, scarring, darker or lighter pigmentary change, recurrence, incomplete removal and infection.

## 2024-01-24 NOTE — PROCEDURE: ADDITIONAL NOTES
Render Risk Assessment In Note?: no
Detail Level: Simple
Additional Notes: Discussed leaving it alone to check versus I&D versus plastics for full removal.

## 2024-10-28 ENCOUNTER — APPOINTMENT (RX ONLY)
Dept: URBAN - METROPOLITAN AREA CLINIC 333 | Facility: CLINIC | Age: 79
Setting detail: DERMATOLOGY
End: 2024-10-28

## 2024-10-28 DIAGNOSIS — L64.8 OTHER ANDROGENIC ALOPECIA: ICD-10-CM | Status: INADEQUATELY CONTROLLED

## 2024-10-28 DIAGNOSIS — L72.8 OTHER FOLLICULAR CYSTS OF THE SKIN AND SUBCUTANEOUS TISSUE: ICD-10-CM

## 2024-10-28 DIAGNOSIS — L81.4 OTHER MELANIN HYPERPIGMENTATION: ICD-10-CM

## 2024-10-28 DIAGNOSIS — D22 MELANOCYTIC NEVI: ICD-10-CM

## 2024-10-28 DIAGNOSIS — L82.1 OTHER SEBORRHEIC KERATOSIS: ICD-10-CM

## 2024-10-28 DIAGNOSIS — L57.8 OTHER SKIN CHANGES DUE TO CHRONIC EXPOSURE TO NONIONIZING RADIATION: ICD-10-CM

## 2024-10-28 PROBLEM — D22.5 MELANOCYTIC NEVI OF TRUNK: Status: ACTIVE | Noted: 2024-10-28

## 2024-10-28 PROCEDURE — ? ADDITIONAL NOTES

## 2024-10-28 PROCEDURE — ? FULL BODY SKIN EXAM

## 2024-10-28 PROCEDURE — ? COUNSELING

## 2024-10-28 PROCEDURE — ? TREATMENT REGIMEN

## 2024-10-28 PROCEDURE — ? COSMETIC CONSULTATION: COOLPEEL

## 2024-10-28 PROCEDURE — 99213 OFFICE O/P EST LOW 20 MIN: CPT

## 2024-10-28 ASSESSMENT — LOCATION DETAILED DESCRIPTION DERM
LOCATION DETAILED: INFERIOR THORACIC SPINE
LOCATION DETAILED: RIGHT CENTRAL MALAR CHEEK
LOCATION DETAILED: RIGHT SUPERIOR MEDIAL UPPER BACK
LOCATION DETAILED: LEFT MEDIAL FRONTAL SCALP

## 2024-10-28 ASSESSMENT — LOCATION ZONE DERM
LOCATION ZONE: SCALP
LOCATION ZONE: FACE
LOCATION ZONE: TRUNK

## 2024-10-28 ASSESSMENT — LOCATION SIMPLE DESCRIPTION DERM
LOCATION SIMPLE: RIGHT CHEEK
LOCATION SIMPLE: UPPER BACK
LOCATION SIMPLE: RIGHT UPPER BACK
LOCATION SIMPLE: LEFT SCALP

## 2024-10-28 NOTE — PROCEDURE: ADDITIONAL NOTES
Additional Notes: Recommended I&D
Render Risk Assessment In Note?: no
Detail Level: Simple
Additional Notes: Recommended Densifi shampoo and conditioner, \\nRecommended Dispensary Medication #6 Alopecia Solution. \\nRecommended Xtrese\\n\\nPt declined recommendations*
Additional Notes: Recommended 3 treatments, pt was quoted $2,295.00 including kit with the October Promotion

## 2024-12-09 ENCOUNTER — OUTPATIENT (OUTPATIENT)
Dept: EMERGENCY DEPT | Facility: HOSPITAL | Age: 79
LOS: 1 days | Discharge: HOME | End: 2024-12-09
Payer: MEDICARE

## 2024-12-09 ENCOUNTER — RESULT REVIEW (OUTPATIENT)
Age: 79
End: 2024-12-09

## 2024-12-09 VITALS — HEIGHT: 63 IN

## 2024-12-09 DIAGNOSIS — R07.9 CHEST PAIN, UNSPECIFIED: ICD-10-CM

## 2024-12-09 LAB
ABO RH CONFIRMATION: SIGNIFICANT CHANGE UP
ALBUMIN SERPL ELPH-MCNC: 3.8 G/DL — SIGNIFICANT CHANGE UP (ref 3.3–5)
ALP SERPL-CCNC: 62 U/L — SIGNIFICANT CHANGE UP (ref 40–120)
ALT FLD-CCNC: 38 U/L — SIGNIFICANT CHANGE UP (ref 12–78)
ANION GAP SERPL CALC-SCNC: 4 MMOL/L — LOW (ref 5–17)
APTT BLD: 29.1 SEC — SIGNIFICANT CHANGE UP (ref 24.5–35.6)
AST SERPL-CCNC: 25 U/L — SIGNIFICANT CHANGE UP (ref 15–37)
BASOPHILS # BLD AUTO: 0.04 K/UL — SIGNIFICANT CHANGE UP (ref 0–0.2)
BASOPHILS NFR BLD AUTO: 0.6 % — SIGNIFICANT CHANGE UP (ref 0–2)
BILIRUB SERPL-MCNC: 0.3 MG/DL — SIGNIFICANT CHANGE UP (ref 0.2–1.2)
BLD GP AB SCN SERPL QL: SIGNIFICANT CHANGE UP
BUN SERPL-MCNC: 21 MG/DL — SIGNIFICANT CHANGE UP (ref 7–23)
CALCIUM SERPL-MCNC: 9.2 MG/DL — SIGNIFICANT CHANGE UP (ref 8.5–10.1)
CHLORIDE SERPL-SCNC: 112 MMOL/L — HIGH (ref 96–108)
CO2 SERPL-SCNC: 26 MMOL/L — SIGNIFICANT CHANGE UP (ref 22–31)
CREAT SERPL-MCNC: 0.9 MG/DL — SIGNIFICANT CHANGE UP (ref 0.5–1.3)
EGFR: 64 ML/MIN/1.73M2 — SIGNIFICANT CHANGE UP
EOSINOPHIL # BLD AUTO: 0.16 K/UL — SIGNIFICANT CHANGE UP (ref 0–0.5)
EOSINOPHIL NFR BLD AUTO: 2.3 % — SIGNIFICANT CHANGE UP (ref 0–6)
GLUCOSE SERPL-MCNC: 113 MG/DL — HIGH (ref 70–99)
HCT VFR BLD CALC: 40.5 % — SIGNIFICANT CHANGE UP (ref 34.5–45)
HGB BLD-MCNC: 13 G/DL — SIGNIFICANT CHANGE UP (ref 11.5–15.5)
IMM GRANULOCYTES NFR BLD AUTO: 0.3 % — SIGNIFICANT CHANGE UP (ref 0–0.9)
INR BLD: 0.93 RATIO — SIGNIFICANT CHANGE UP (ref 0.85–1.16)
LYMPHOCYTES # BLD AUTO: 2.53 K/UL — SIGNIFICANT CHANGE UP (ref 1–3.3)
LYMPHOCYTES # BLD AUTO: 37.2 % — SIGNIFICANT CHANGE UP (ref 13–44)
MCHC RBC-ENTMCNC: 28.2 PG — SIGNIFICANT CHANGE UP (ref 27–34)
MCHC RBC-ENTMCNC: 32.1 G/DL — SIGNIFICANT CHANGE UP (ref 32–36)
MCV RBC AUTO: 87.9 FL — SIGNIFICANT CHANGE UP (ref 80–100)
MONOCYTES # BLD AUTO: 0.77 K/UL — SIGNIFICANT CHANGE UP (ref 0–0.9)
MONOCYTES NFR BLD AUTO: 11.3 % — SIGNIFICANT CHANGE UP (ref 2–14)
NEUTROPHILS # BLD AUTO: 3.29 K/UL — SIGNIFICANT CHANGE UP (ref 1.8–7.4)
NEUTROPHILS NFR BLD AUTO: 48.3 % — SIGNIFICANT CHANGE UP (ref 43–77)
NT-PROBNP SERPL-SCNC: 227 PG/ML — SIGNIFICANT CHANGE UP (ref 0–450)
PLATELET # BLD AUTO: 284 K/UL — SIGNIFICANT CHANGE UP (ref 150–400)
POTASSIUM SERPL-MCNC: 3.9 MMOL/L — SIGNIFICANT CHANGE UP (ref 3.5–5.3)
POTASSIUM SERPL-SCNC: 3.9 MMOL/L — SIGNIFICANT CHANGE UP (ref 3.5–5.3)
PROT SERPL-MCNC: 7.1 GM/DL — SIGNIFICANT CHANGE UP (ref 6–8.3)
PROTHROM AB SERPL-ACNC: 10.7 SEC — SIGNIFICANT CHANGE UP (ref 9.9–13.4)
RBC # BLD: 4.61 M/UL — SIGNIFICANT CHANGE UP (ref 3.8–5.2)
RBC # FLD: 13.9 % — SIGNIFICANT CHANGE UP (ref 10.3–14.5)
SODIUM SERPL-SCNC: 142 MMOL/L — SIGNIFICANT CHANGE UP (ref 135–145)
TROPONIN I, HIGH SENSITIVITY RESULT: 46.41 NG/L — SIGNIFICANT CHANGE UP
WBC # BLD: 6.81 K/UL — SIGNIFICANT CHANGE UP (ref 3.8–10.5)
WBC # FLD AUTO: 6.81 K/UL — SIGNIFICANT CHANGE UP (ref 3.8–10.5)

## 2024-12-09 PROCEDURE — C1887: CPT

## 2024-12-09 PROCEDURE — 80048 BASIC METABOLIC PNL TOTAL CA: CPT

## 2024-12-09 PROCEDURE — 85025 COMPLETE CBC W/AUTO DIFF WBC: CPT

## 2024-12-09 PROCEDURE — C1725: CPT

## 2024-12-09 PROCEDURE — 92921: CPT | Mod: LD

## 2024-12-09 PROCEDURE — 99285 EMERGENCY DEPT VISIT HI MDM: CPT

## 2024-12-09 PROCEDURE — C1753: CPT

## 2024-12-09 PROCEDURE — C1874: CPT

## 2024-12-09 PROCEDURE — 93005 ELECTROCARDIOGRAM TRACING: CPT

## 2024-12-09 PROCEDURE — 93010 ELECTROCARDIOGRAM REPORT: CPT

## 2024-12-09 PROCEDURE — 92978 ENDOLUMINL IVUS OCT C 1ST: CPT | Mod: LD

## 2024-12-09 PROCEDURE — 71045 X-RAY EXAM CHEST 1 VIEW: CPT | Mod: 26

## 2024-12-09 PROCEDURE — C9600: CPT | Mod: LD

## 2024-12-09 PROCEDURE — 93306 TTE W/DOPPLER COMPLETE: CPT | Mod: 26

## 2024-12-09 PROCEDURE — C1769: CPT

## 2024-12-09 PROCEDURE — 76376 3D RENDER W/INTRP POSTPROCES: CPT | Mod: 26

## 2024-12-09 PROCEDURE — C1894: CPT

## 2024-12-09 PROCEDURE — 93458 L HRT ARTERY/VENTRICLE ANGIO: CPT | Mod: 59

## 2024-12-09 PROCEDURE — 36415 COLL VENOUS BLD VENIPUNCTURE: CPT

## 2024-12-09 RX ORDER — METOPROLOL TARTRATE 100 MG/1
1 TABLET, FILM COATED ORAL
Refills: 0 | DISCHARGE

## 2024-12-09 RX ORDER — LEVOTHYROXINE SODIUM 150 MCG
50 TABLET ORAL DAILY
Refills: 0 | Status: DISCONTINUED | OUTPATIENT
Start: 2024-12-09 | End: 2024-12-10

## 2024-12-09 RX ORDER — METOPROLOL TARTRATE 100 MG/1
25 TABLET, FILM COATED ORAL
Refills: 0 | Status: DISCONTINUED | OUTPATIENT
Start: 2024-12-09 | End: 2024-12-10

## 2024-12-09 RX ORDER — SODIUM CHLORIDE 9 MG/ML
250 INJECTION, SOLUTION INTRAMUSCULAR; INTRAVENOUS; SUBCUTANEOUS ONCE
Refills: 0 | Status: COMPLETED | OUTPATIENT
Start: 2024-12-09 | End: 2024-12-09

## 2024-12-09 RX ORDER — LOSARTAN POTASSIUM 100 MG/1
50 TABLET, FILM COATED ORAL DAILY
Refills: 0 | Status: DISCONTINUED | OUTPATIENT
Start: 2024-12-10 | End: 2024-12-10

## 2024-12-09 RX ORDER — ACETAMINOPHEN 500MG 500 MG/1
1000 TABLET, COATED ORAL ONCE
Refills: 0 | Status: COMPLETED | OUTPATIENT
Start: 2024-12-09 | End: 2024-12-09

## 2024-12-09 RX ORDER — MAGNESIUM, ALUMINUM HYDROXIDE 200-225/5
30 SUSPENSION, ORAL (FINAL DOSE FORM) ORAL EVERY 4 HOURS
Refills: 0 | Status: DISCONTINUED | OUTPATIENT
Start: 2024-12-09 | End: 2024-12-10

## 2024-12-09 RX ORDER — CLOPIDOGREL 75 MG/1
75 TABLET, FILM COATED ORAL DAILY
Refills: 0 | Status: DISCONTINUED | OUTPATIENT
Start: 2024-12-10 | End: 2024-12-10

## 2024-12-09 RX ORDER — ROSUVASTATIN CALCIUM 5 MG/1
20 TABLET, FILM COATED ORAL AT BEDTIME
Refills: 0 | Status: DISCONTINUED | OUTPATIENT
Start: 2024-12-09 | End: 2024-12-10

## 2024-12-09 RX ORDER — LEVOTHYROXINE SODIUM 150 MCG
1 TABLET ORAL
Refills: 0 | DISCHARGE

## 2024-12-09 RX ORDER — PANTOPRAZOLE SODIUM 40 MG/1
40 TABLET, DELAYED RELEASE ORAL ONCE
Refills: 0 | Status: COMPLETED | OUTPATIENT
Start: 2024-12-09 | End: 2024-12-09

## 2024-12-09 RX ADMIN — Medication 30 MILLILITER(S): at 10:48

## 2024-12-09 RX ADMIN — PANTOPRAZOLE SODIUM 40 MILLIGRAM(S): 40 TABLET, DELAYED RELEASE ORAL at 10:48

## 2024-12-09 RX ADMIN — METOPROLOL TARTRATE 25 MILLIGRAM(S): 100 TABLET, FILM COATED ORAL at 22:30

## 2024-12-09 RX ADMIN — SODIUM CHLORIDE 250 MILLILITER(S): 9 INJECTION, SOLUTION INTRAMUSCULAR; INTRAVENOUS; SUBCUTANEOUS at 07:38

## 2024-12-09 RX ADMIN — Medication 30 MILLILITER(S): at 22:30

## 2024-12-09 RX ADMIN — ACETAMINOPHEN 500MG 400 MILLIGRAM(S): 500 TABLET, COATED ORAL at 18:05

## 2024-12-09 NOTE — ED ADULT NURSE NOTE - NS ED NOTE ABUSE SUSPICION NEGLECT YN
[facility]  EMERGENCY DEPARTMENT ENCOUNTER      Patient seen at 1254hrs.    CHIEF COMPLAINT    Chief Complaint   Patient presents with   • Drug Overdose         HPI    Angelina Martinez is a 48 year old woman who presents to the emergency department for evaluation of accidentally taking an extra dose of her medication.    Patient states that she accidentally took a second dose of her morning medications at bedtime last night.  She then took her normal evening meds and her normally scheduled medication again this morning.  Patient states she took an extra dose last night of metoprolol succinate 100 mg, losartan 100 mg, hydrochlorothiazide 12.5 mg, clopidogrel 75 mg, aspirin 81 mg, bupropion, and lorazepam.    Patient states that around 1130 she developed a feeling of heaviness in her chest, tinnitus, and feeling dizzy.  Patient states she is also been feeling anxious or worrying about the extra dose of medication.    ALLERGIES    Allergies   Allergen Reactions   • Toradol HIVES   • Fentanyl RASH   • Skin Adhesives Other (See Comments)     Itchy, Paper tape    • Cheese   (Food Or Med) Other (See Comments)       CURRENT MEDICATIONS    (Not in a hospital admission)       PAST MEDICAL HISTORY    Past Medical History:   Diagnosis Date   • Anxiety disorder    • Carotid artery disease (CMS/HCC)    • Depression    • Essential (primary) hypertension    • Gastroesophageal reflux disease    • Kidney stones    • Myocardial infarct (CMS/HCC) 2020    In [place]    • Subclavian artery stenosis (CMS/HCC)        SURGICAL HISTORY    Past Surgical History:   Procedure Laterality Date   • BACK SURGERY      L4L5   • PARTIAL HYSTERECTOMY     • REMOVAL GALLBLADDER         SOCIAL HISTORY    Social History     Tobacco Use   • Smoking status: Former     Packs/day: 0.50     Years: 32.00     Pack years: 16.00     Types: Cigarettes     Quit date: 2022     Years since quittin.6   • Smokeless tobacco: Never   Vaping  Use   • Vaping Use: never used   Substance Use Topics   • Alcohol use: Yes     Comment: rare   • Drug use: Not Currently       FAMILY HISTORY    Family History   Problem Relation Age of Onset   • Hypertension Mother    • Myocardial Infarction Father         X3        REVIEW OF SYSTEMS    Review of Systems   Constitutional: Negative for fever.   Respiratory: Positive for chest tightness.    Neurological: Positive for light-headedness.   Psychiatric/Behavioral: The patient is nervous/anxious.       _    PHYSICAL EXAM    ED Triage Vitals [01/09/23 1208]   /85   Heart Rate 69   Resp 18   Temp 98.3 °F (36.8 °C)   SpO2 98 %     Physical Exam  Vitals and nursing note reviewed.   Constitutional:       General: She is not in acute distress.     Appearance: Normal appearance.   HENT:      Head: Normocephalic and atraumatic.   Eyes:      Conjunctiva/sclera: Conjunctivae normal.   Neck:      Comments: Trachea midline  Cardiovascular:      Rate and Rhythm: Normal rate and regular rhythm.      Heart sounds: No murmur heard.  Pulmonary:      Effort: Pulmonary effort is normal. No respiratory distress.      Breath sounds: Normal breath sounds.   Abdominal:      Palpations: Abdomen is soft.      Tenderness: There is no abdominal tenderness.   Skin:     General: Skin is warm and dry.   Neurological:      Mental Status: She is alert and oriented to person, place, and time.   Psychiatric:      Comments: Mildly anxious          ECG and Rhythm Strip  ECG  12 lead ECG performed at 1251.   Normal sinus rhythm at a rate of 63, normal axis, normal's, no ectopy, no ST changes.  ECG interpretation by emergency physician.    -    RADIOLOGY    Imaging Results    None           LABS    Results for orders placed or performed during the hospital encounter of 01/09/23   Comprehensive Metabolic Panel   Result Value    Fasting Status     Sodium 138    Potassium 3.4    Chloride 101    Carbon Dioxide 30    Anion Gap 10    Glucose 101 (H)    BUN 13     Creatinine 1.04 (H)    Glomerular Filtration Rate 65     Comment: eGFR results = or >60 mL/min/1.73m2 = Normal kidney function. Estimated GFR calculated using the CKD-EPI-R (2021) equation that does not include race in the creatinine calculation.    BUN/ Creatinine Ratio 13    Calcium 9.0    Bilirubin, Total 0.5    GOT/AST 15    GPT/ALT 21    Alkaline Phosphatase 67    Albumin 3.8    Protein, Total 7.6    Globulin 3.8    A/G Ratio 1.0   Prothrombin Time   Result Value    Prothrombin Time 10.0    INR 1.0     Comment: INR Therapeutic Range: 2.0 to 3.0 (2.5 to 3.5 recommended for recurrent thrombotic episodes and mechanical prosthetic heart valves.)   CBC with Automated Differential (performable only)   Result Value    WBC 12.2 (H)    RBC 4.93    HGB 14.5    HCT 43.4    MCV 88.0    MCH 29.4    MCHC 33.4    RDW-CV 14.1    RDW-SD 45.3        NRBC 0    Neutrophil, Percent 71    Lymphocytes, Percent 22    Mono, Percent 6    Eosinophils, Percent 1    Basophils, Percent 0    Immature Granulocytes 0    Absolute Neutrophils 8.5 (H)    Absolute Lymphocytes 2.7    Absolute Monocytes 0.8    Absolute Eosinophils  0.1    Absolute Basophils 0.1    Absolute Immmature Granulocytes 0.1   TROPONIN I, HIGH SENSITIVITY   Result Value    Troponin I, High Sensitivity 4   Acetaminophen Level   Result Value    Acetaminophen 7 (L)   Salicylate Level   Result Value    Salicylate 3.1   Drug Abuse Screen, Urine   Result Value    Amphetamines, Urine Negative     Comment: Cutoff = 500 ng/mL    Barbiturates, Urine Negative     Comment: Cutoff = 200 ng/mL    Benzodiazepines, Urine Negative     Comment: Cutoff = 200 ng/mL    Cocaine/ Metabolite, Urine Negative     Comment: Cutoff = 150 ng/ml    Opiates, Urine Negative     Comment: Cutoff = 300 ng/mL    Phencyclidine, Urine Negative     Comment: Cutoff = 25 ng/mL    Cannabinoids, Urine Negative     Comment: Cutoff = 50 ng/mL   Magnesium   Result Value    Magnesium 1.6 (L)            ED  Medication Orders (From admission, onward)    None          I have reviewed Angelina Martinez's previous office visit note from December 29, 2022.  Also reviewed prior ED visit notes..          ED Course as of 01/09/23 1717   Mon Jan 09, 2023   1444 Patient appears comfortable and in no distress.  Reviewed laboratory results with patient.  Magnesium slightly low 1.6 and will give oral magnesium.    Patient informed nurse that she does not want to wait until 1700 hrs. as recommended by poison control and insisting on leaving now.  I discussed the reasoning behind further cardiac monitoring.  Patient understands the possibility of deterioration including arrhythmia that could become life-threatening.  Patient possesses decision-making capacity and request discharge at this time.    In summary, patient presents emergency department with concerns after accidentally taking extra dose of metoprolol, losartan, Plavix, aspirin, bupropion, and lorazepam yesterday evening.  Has complained of chest discomfort and feeling anxious since this morning.  Labs except for slightly low magnesium and physical exam reassuring.  Patient received oral magnesium replacement.  Refusing further monitoring.  She will follow-up with primary care. [DG]      ED Course User Index  [DG] Mario Joseph MD         FINAL IMPRESSION    Disposition:  Pt will be discharged.      Impression:  1. Accidental overdose, initial encounter    2. Hypomagnesemia    3. Chest pain with low risk of acute coronary syndrome          Follow up:  Stacia Fowler DO  [address] [facility]  [facility] [address]  [facility] [address]  [phone]    In 3 days          New Medications:  Discharge Medication List as of 1/9/2023  2:51 PM                 PPE worn for this encounter included gloves and surgical mask     Mario Joseph MD  01/09/23 8923     No

## 2024-12-09 NOTE — ED PROVIDER NOTE - PROGRESS NOTE DETAILS
Pablo Conrad DO (Attending): Spoke with Dr. Hamilton, request patient be admitted for cardiac cath later today.

## 2024-12-09 NOTE — PACU DISCHARGE NOTE - COMMENTS
pt aaox4, pt con't to c/o burning sensation in her throat, chest and back pain, 3/10, Laurie NP aware. nsr, vss, Right Radial hemoband intact, no s/sx of bleeding, hematoma, swelling noted. RUE warm and mobile, pt c/o TTP over the right wrist. NP laurie aware. report given to eKely RICHARD from CCU. pt transport to unit

## 2024-12-09 NOTE — ED PROVIDER NOTE - PHYSICAL EXAMINATION
GEN: Patient awake alert NAD.   HEENT: normocephalic, atraumatic, EOMI, no scleral icterus, moist MM  CARDIAC: RRR, S1, S2, no murmur.   PULM: CTA B/L no wheeze, rhonchi, rales.   ABD: soft NT, ND, no rebound no guarding,   MSK: Moving all extremities, no edema.   NEURO: A&Ox3, no focal neurological deficits  SKIN: warm, dry, no rash. 2+ pulse in all extremities

## 2024-12-09 NOTE — CHART NOTE - NSCHARTNOTEFT_GEN_A_CORE
Rt. radial band pulled manual pressure applied x20 minutes site benign soft no bleeding no hematoma +1 radial pulse   small soft hematoma noted on forearm cold compress applied to site. Rt. radial band pulled manual pressure applied x20 minutes site benign soft no bleeding +1 radial pulse   small soft hematoma noted on forearm cold compress applied to site.

## 2024-12-09 NOTE — CONSULT NOTE ADULT - SUBJECTIVE AND OBJECTIVE BOX
Patient is a 79y old  Female who presents with a chief complaint of Chest pain (09 Dec 2024 07:42)      HPI:  79-year-old female PMH of hyperlipidemia, hypothyroidism presents ED sent in by cardiologist Dr. Hamilton for cardiac catheterization.  Patient's been having exertional chest pain (burning) intermittently for the last 2 weeks and had abnormal troponin's as an outpatient.  Patient is currently asymptomatic.  Denies associated diaphoresis, shortness of breath, nausea, vomiting, abdominal pain. Pt. referred for [facility] for further evaluation  (09 Dec 2024 07:42)      PAST MEDICAL & SURGICAL HISTORY:  HLD (hyperlipidemia)      Hypothyroidism          PREVIOUS CARDIAC WORKUP:      Echo:  Stress Test:  Cardiac Cath:    ALLERGIES:    levofloxacin (Unknown)  penicillin (Unknown)  clarithromycin (Unknown)       MEDICATIONS  (STANDING):    MEDICATIONS  (PRN):      FAMILY HISTORY:        SOCIAL HISTORY:  .scl    ROS:     .ros    Vital Signs Last 24 Hrs  T(C): 37.2 (09 Dec 2024 07:21), Max: 37.2 (09 Dec 2024 07:17)  T(F): 98.9 (09 Dec 2024 07:21), Max: 98.9 (09 Dec 2024 07:17)  HR: 80 (09 Dec 2024 07:21) (79 - 80)  BP: 163/83 (09 Dec 2024 07:21) (163/83 - 167/90)  BP(mean): 112 (09 Dec 2024 05:56) (112 - 112)  RR: 18 (09 Dec 2024 07:21) (18 - 18)  SpO2: 98% (09 Dec 2024 07:21) (98% - 99%)    Parameters below as of 09 Dec 2024 07:21  Patient On (Oxygen Delivery Method): room air          PHYSICAL EXAM:    .phy      TELEMETRY:    ECG:    LABS:                          13.0   6.81  )-----------( 284      ( 09 Dec 2024 06:34 )             40.5     12-09    142  |  112[H]  |  21  ----------------------------<  113[H]  3.9   |  26  |  0.90    Ca    9.2      09 Dec 2024 06:34    TPro  7.1  /  Alb  3.8  /  TBili  0.3  /  DBili  x   /  AST  25  /  ALT  38  /  AlkPhos  62  12-09 12-09 @ 06:34  Trop-I  46.41  CK      --  CK-MB   --        Pro BNP   12-09 @ 06:34  227    D Dimer   12-09 @ 06:34  --      PT/INR - ( 09 Dec 2024 06:34 )   PT: 10.7 sec;   INR: 0.93 ratio         PTT - ( 09 Dec 2024 06:34 )  PTT:29.1 sec      RADIOLOGY & ADDITIONAL STUDIES:     79-year-old female admitted with complaints of burning chest discomfort over the last 2 weeks.  Saw her cardiologist as outpatient and had blood work done which revealed abnormal troponin.  Patient was advised to come to the hospital for further evaluation and possible left heart catheterization.  Prior history of hyperlipidemia, hypothyroidism.  No known history of prior coronary artery disease.      PAST MEDICAL & SURGICAL HISTORY:  HLD (hyperlipidemia)  Hypothyroidism      PREVIOUS CARDIAC WORKUP:      Echo: 6/2024  CONCLUSION:  --The interventricular septum is mildly hypertrophied.  --LV global wall motion is normal.  --LV ejection fraction is normal (60 %).  --The global LV longitudinal strain using the speckle tracking technology is -19.1 %. Global LV   longitudinal strain is within normal limits.  --There is mild aortic valve thickening. There is mild aortic regurgitation.  --There is mild mitral regurgitation.  --There is mild tricuspid regurgitation.  --There is mild pulmonic regurgitation.  --The right atrial pressure is normal (0 - 5 mm Hg). There is no pulmonary hypertension.  --There is no pericardial effusion.    Stress Test: 7/2024  Conclusion  •  There are no fixed or reversible myocardial perfusion defects seen on stress or rest imaging. No ischemia or infarction seen.  •  Positive ECG evidence of ischemia during exercise stress test.  •  SPECT results are normal.  •  Gated wall motion analysis shows normal wall motion.  •  Rest gated analysis showed normal left ventricular function with normal left ventricle size.  •  Post stress analysis showed normal left ventricular function with normal left ventricle size.  •  The patient performed treadmill exercise under Kaleb protocol  •  Achieved an estimated workload of  6 metabolic equivalent (METS). Exercise time: 4 minutes, 0   seconds  •  The blood pressure response was appropriate.  •  Rest myocardial perfusion gated SPECT imaging was performed, showing a left ventricular ejection fraction of 86 %,  •  Post stress resting myocardial perfusion gated SPECT imaging was performed, showing a left ventricular ejection fraction of 84 %,      ALLERGIES:    levofloxacin (Unknown)  penicillin (Unknown)  clarithromycin (Unknown)       MEDICATIONS  (HOME):    metoprolol tartrate (LOPRESSOR) 25 mg tablet  nitroGLYCERIN (NITROSTAT) 0.4 mg SL tablet  rosuvastatin (CRESTOR) 20 mg tablet  aspirin (ECOTRIN LOW STRENGTH) 81 mg EC tablet  levothyroxine (SYNTHROID) 50 mcg tablet  pantoprazole (PROTONIX) 20 mg tablet  Coenzyme Q10 10 mg Cap  latanoprost (XALATAN) 0.005 % ophthalmic solution      FAMILY HISTORY:  NC        SOCIAL HISTORY:  Nonsmoker. No ETOH abuse. No illicit drugs.     ROS:     Detailed ten system ROS was performed and was negative except for history as eluded to above.    no fever  no chills  no nausea  no vomiting  no diarrhea  no constipation  no melena  no hematochezia  + chest pain  no palpitations  no sob at rest  + dyspnea on exertion  no cough  no wheezing  no anorexia  no headache  no dizziness  no syncope  no weakness  no myalgia  no dysuria  no polyuria  no hematuria       Vital Signs Last 24 Hrs  T(C): 37.2 (09 Dec 2024 07:21), Max: 37.2 (09 Dec 2024 07:17)  T(F): 98.9 (09 Dec 2024 07:21), Max: 98.9 (09 Dec 2024 07:17)  HR: 80 (09 Dec 2024 07:21) (79 - 80)  BP: 163/83 (09 Dec 2024 07:21) (163/83 - 167/90)  BP(mean): 112 (09 Dec 2024 05:56) (112 - 112)  RR: 18 (09 Dec 2024 07:21) (18 - 18)  SpO2: 98% (09 Dec 2024 07:21) (98% - 99%)    Parameters below as of 09 Dec 2024 07:21  Patient On (Oxygen Delivery Method): room air      PHYSICAL EXAM:    General:                Comfortable, AAO X 3, in no distress.  HEENT:                  Unremarkable.   Neck:                     Supple, no adenopathy, no thyromegaly, no JVD, no bruit.  Chest:                    Clear, B/L symmetric air entry, no tachypnea  Heart:                     S1, S2 normal, no gallop, no murmur.  Abdomen:              Soft, non-tender, bowel sounds active. No palpable masses.  Extremities:           no cyanosis, no edema.   Neurologic:            Grossly nonfocal.       TELEMETRY:   Normal sinus rhythm with no tachy or karthik events     ECG:   Normal    LABS:                          13.0   6.81  )-----------( 284      ( 09 Dec 2024 06:34 )             40.5     12-09    142  |  112[H]  |  21  ----------------------------<  113[H]  3.9   |  26  |  0.90    Ca    9.2      09 Dec 2024 06:34    TPro  7.1  /  Alb  3.8  /  TBili  0.3  /  DBili  x   /  AST  25  /  ALT  38  /  AlkPhos  62  12-09 12-09 @ 06:34  Trop-I  46.41      Pro BNP   12-09 @ 06:34  227        PT/INR - ( 09 Dec 2024 06:34 )   PT: 10.7 sec;   INR: 0.93 ratio    PTT - ( 09 Dec 2024 06:34 )  PTT:29.1 sec      RADIOLOGY & ADDITIONAL STUDIES:    < from: Xray Chest 1 View- PORTABLE-Urgent (12.09.24 @ 07:01) >  IMPRESSION: No focal infiltrate or congestion. Heart is within normal   limits in its transthoracic diameter. Regional osseous structures   appropriate for age

## 2024-12-09 NOTE — PATIENT PROFILE ADULT - FUNCTIONAL ASSESSMENT - BASIC MOBILITY 5.
Pharmacokinetic Consult - Enoxaparin Dosing  Julissa Kee is a 68 y.o. female who has been consulted for enoxaparin dosing for atrial fibrillation - requiring full anticoagulation    Relevant clinical data and objective history reviewed:  Creatinine   Date Value Ref Range Status   01/02/2020 0.79 0.57 - 1.00 mg/dL Final     Estimated Creatinine Clearance: 102.5 mL/min (by C-G formula based on SCr of 0.79 mg/dL).  I/O last 3 completed shifts:  In: 3816 [P.O.:840; I.V.:2876; IV Piggyback:100]  Out: 1525 [Urine:1525]  Patient weight: (!) 166 kg (366 lb 8 oz)    Diagnosis: Atrial fibrillation - requiring full anticoagulation  Consulting Provider: hospitalist      Asessment/Plan  1. Give enoxaparin 1 mg/kg (170mg) every 12 hours.  Last dose     2. Pharmacy will continue to monitor patient's renal function for further potential dose adjustments.    Tiago Fermin, PharmD, BCPS  1/3/2020  2:09 AM   4 = No assist / stand by assistance

## 2024-12-09 NOTE — H&P ADULT - ASSESSMENT
79-year-old female PMH of hyperlipidemia, hypothyroidism presents ED sent in by cardiologist Dr. Hamilton for cardiac catheterization.  Patient's been having exertional chest pain (burning) intermittently for the last 2 weeks and had abnormal troponin' s  as an outpatient.  Patient is currently asymptomatic.  Denies associated diaphoresis, shortness of breath, nausea, vomiting, abdominal pain. Pt. referred for [facility] for further evaluation   Consent obtained for and signed for cardiac cath with coronary angiogram and possible stent placement with possible sedation and analgesia. C risks, benefits and alternatives discussed with patient. Risk discussed included, but not limited to MI, stroke, mortality, major bleeding, arrythmia, or infection, educational material provided. Pt. verbalizes and understands pre-procedural instructions.   ASA: II   Bleeding Risk Score:1.9  Creatinine: 0.90  GFR:  64  Luca Score: 5 points   Pt. was pre-hydrated with sodium chloride 0.9% 250cc bolus IV x1

## 2024-12-09 NOTE — H&P ADULT - HISTORY OF PRESENT ILLNESS
79-year-old female PMH of hyperlipidemia, hypothyroidism presents ED sent in by cardiologist Dr. Hamilton for cardiac catheterization.  Patient's been having exertional chest pain (burning) intermittently for the last 2 weeks and had abnormal troponin's as an outpatient.  Patient is currently asymptomatic.  Denies associated diaphoresis, shortness of breath, nausea, vomiting, abdominal pain. Pt. referred for [facility] for further evaluation

## 2024-12-09 NOTE — CONSULT NOTE ADULT - ASSESSMENT
Unstable angina  Trop +ve as out pt    Suggest:    Cardiac cath today Unstable angina  Trop +ve as out pt  Hyperlipidemia    Suggest:    Cardiac monitor  O2 supplement  Follow up Cardiac enzymes  Treat with aspirin, Plavix  Beta blockers  Statins  Nitrates PRN  Ischemia evaluation - Based on pt's symptoms, history, risk factors, exam, lab and EKG data I have advised pt have a cardiac catheterization and possible percutaneous coronary intervention. Procedure, its risks, alternatives, benefits and potential complications were discussed in detail. Risks include but not limited to bleeding, infection, allergy, renal failure requiring dialysis, stroke, vascular injury, pericardial tamponade, arrhythmias, MI and even death. Pt is agreeable and has consented for the procedure and the procedure is being scheduled.   Cardiac cath today

## 2024-12-09 NOTE — CHART NOTE - NSCHARTNOTEFT_GEN_A_CORE
Cardiac Rehab Referral     YOKO NETTLES 79yF  MRN: [id number]  : 43  Admit: 24  Patient is a 79y old  Female who presents with a chief complaint of Chest pain (09 Dec 2024 10:33)      Pt.  s/p LHC with [facility] to LAD    Qualified for cardiac rehab     -Patient educated on benefits of cardiac rehab. Information packet provided  with participating locations given to patient along with being advised to contact their insurance company for list of participating providers.   -Patient discharge paperwork will included detailed cardiovascular history, medications, testing/treatments and advised to provide all information with their primary outpatient cardiologist at a follow in 1-2 weeks from discharge.     Patient accepted cardiac rehab and referral sent to facility of their choice

## 2024-12-09 NOTE — ED PROVIDER NOTE - CLINICAL SUMMARY MEDICAL DECISION MAKING FREE TEXT BOX
Pablo Conrad DO (Attending): 79-year-old female past ministry of hyperlipidemia, hypothyroidism presents ED sent in by cardiologist Dr. Hamilton for cardiac catheterization.  Patient's been having exertional chest pain intermittently for the last 2 weeks and had abnormal troponins as an outpatient.  Patient well-appearing, hemodynamically stable with nonfocal exam.  Differential includes not limited to ACS, no concern for PE or aortic pathology.  Plan for labs, EKG, telemetry, chest x-ray and admission.

## 2024-12-09 NOTE — ED ADULT NURSE NOTE - OBJECTIVE STATEMENT
Pt presents to ED c/o chest pain for 2 weeks and increased dyspnea on exertion. Patient states she went to see her cardiologist on friday and had a normal EKG. Pt endorses having abnormal blood results and was told to come to ER. Pt currently denies chest pain and SOB. Pt states cardiologist will see her for a cardiac cath. A&Ox4. No acute distress noted. Hx of cholesterol.

## 2024-12-09 NOTE — PATIENT PROFILE ADULT - FALL HARM RISK - HARM RISK INTERVENTIONS
Assistance with ambulation/Assistance OOB with selected safe patient handling equipment/Communicate Risk of Fall with Harm to all staff/Discuss with provider need for PT consult/Monitor gait and stability/Provide patient with walking aids - walker, cane, crutches/Reinforce activity limits and safety measures with patient and family/Sit up slowly, dangle for a short time, stand at bedside before walking/Tailored Fall Risk Interventions/Use of alarms - bed, chair and/or voice tab/Visual Cue: Yellow wristband and red socks/Bed in lowest position, wheels locked, appropriate side rails in place/Call bell, personal items and telephone in reach/Instruct patient to call for assistance before getting out of bed or chair/Non-slip footwear when patient is out of bed/[place] to call system/Physically safe environment - no spills, clutter or unnecessary equipment/Purposeful Proactive Rounding/Room/bathroom lighting operational, light cord in reach

## 2024-12-09 NOTE — PROGRESS NOTE ADULT - SUBJECTIVE AND OBJECTIVE BOX
Nurse Practitioner Progress note:     HPI:  79-year-old female PMH of hyperlipidemia, hypothyroidism presents ED sent in by cardiologist Dr. Hamilton for cardiac catheterization.  Patient's been having exertional chest pain (burning) intermittently for the last 2 weeks and had abnormal troponin's as an outpatient.  Patient is currently asymptomatic.  Denies associated diaphoresis, shortness of breath, nausea, vomiting, abdominal pain. Pt. referred for [facility] for further evaluation  (09 Dec 2024 07:42)    T(C): 37.2 (12-09-24 @ 07:21), Max: 37.2 (12-09-24 @ 07:17)  HR: 67 (12-09-24 @ 10:05) (63 - 80)  BP: 146/73 (12-09-24 @ 10:05) (112/70 - 167/90)  RR: 18 (12-09-24 @ 10:05) (18 - 18)  SpO2: 97% (12-09-24 @ 10:05) (91% - 99%)  Wt(kg): --    PHYSICAL EXAM:  Neurologic: Non-focal, AxOx3.  No neuro deficits  Vascular: Peripheral pulses palpable 2+ bilaterally  Procedure Site: RT. radial band in place site benign soft no bleeding no hematoma +1 radial pulse no c/o numbness/tingling, <3sec cap refill, fingers/hand warm to touch     LABS:	 	                        13.0   6.81  )-----------( 284      ( 09 Dec 2024 06:34 )             40.5   12-09    142  |  112[H]  |  21  ----------------------------<  113[H]  3.9   |  26  |  0.90    Ca    9.2      09 Dec 2024 06:34    TPro  7.1  /  Alb  3.8  /  TBili  0.3  /  DBili  x   /  AST  25  /  ALT  38  /  AlkPhos  62  12-09    PROCEDURE RESULTS:  S/P C S/P CHARLES x2 to LAD     ASSESSMENT/PLAN: 	  -Admit to CCU  -VS, labs, diet, activity as per PCI orders  -IV hydration  -Encourage PO fluids  -ASA 81mgavix 75mg  -Crestor 20mg  -Lopressor  25mg BID   -Pt. qualifies for cardiac rehab, educational material provided to patient pt. ....  -Sedation instructions reviewed with patient   -Plan of care D/W pt. and MD  -Discussed therapeutic lifestyle changes to reduce risk factors such as following a cardiac diet, weight loss, maintaining a healthy weight, exercise, smoking cessation, medication compliance, and regular follow-up  with MD to know our numbers (BP, cholesterol, weight, and glucose  -If pt. remains stable overnight possible D/C in AM  - Follow-up AM labs/EKG/site check  -Follow-up with attending/cardiologist       Nurse Practitioner Progress note:     HPI:  79-year-old female PMH of hyperlipidemia, hypothyroidism presents ED sent in by cardiologist Dr. Hamilton for cardiac catheterization.  Patient's been having exertional chest pain (burning) intermittently for the last 2 weeks and had abnormal troponin's as an outpatient.  Patient is currently asymptomatic.  Denies associated diaphoresis, shortness of breath, nausea, vomiting, abdominal pain. Pt. referred for [facility] for further evaluation  (09 Dec 2024 07:42)    T(C): 37.2 (12-09-24 @ 07:21), Max: 37.2 (12-09-24 @ 07:17)  HR: 67 (12-09-24 @ 10:05) (63 - 80)  BP: 146/73 (12-09-24 @ 10:05) (112/70 - 167/90)  RR: 18 (12-09-24 @ 10:05) (18 - 18)  SpO2: 97% (12-09-24 @ 10:05) (91% - 99%)  Wt(kg): --    PHYSICAL EXAM:  Neurologic: Non-focal, AxOx3.  No neuro deficits  Vascular: Peripheral pulses palpable 2+ bilaterally  Procedure Site: Rt. radial band in place site benign soft no bleeding no hematoma +1 radial pulse no c/o numbness/tingling, <3sec cap refill, fingers/hand warm to touch     LABS:	 	                        13.0   6.81  )-----------( 284      ( 09 Dec 2024 06:34 )             40.5   12-09    142  |  112[H]  |  21  ----------------------------<  113[H]  3.9   |  26  |  0.90    Ca    9.2      09 Dec 2024 06:34    TPro  7.1  /  Alb  3.8  /  TBili  0.3  /  DBili  x   /  AST  25  /  ALT  38  /  AlkPhos  62  12-09    PROCEDURE RESULTS:  S/P C S/P CHARLES x2 to LAD     ASSESSMENT/PLAN: 	  -Admit to CCU  -VS, labs, diet, activity as per PCI orders  -IV hydration  -Encourage PO fluids  -ASA 81mg  -Plavix 75mg  -Crestor 20mg  -Lopressor  25mg BID   Losartan 25mg   -Pt. qualifies for cardiac rehab, educational material provided to patient pt. ....  -Sedation instructions reviewed with patient   -Plan of care D/W pt. and MD  -Discussed therapeutic lifestyle changes to reduce risk factors such as following a cardiac diet, weight loss, maintaining a healthy weight, exercise, smoking cessation, medication compliance, and regular follow-up  with MD to know our numbers (BP, cholesterol, weight, and glucose  -If pt. remains stable overnight possible D/C in AM  - Follow-up AM labs/EKG/site check  -Follow-up with attending/cardiologist       Nurse Practitioner Progress note:     HPI:  79-year-old female PMH of hyperlipidemia, hypothyroidism presents ED sent in by cardiologist Dr. Hamilton for cardiac catheterization.  Patient's been having exertional chest pain (burning) intermittently for the last 2 weeks and had abnormal troponin' s as an outpatient.  Patient is currently asymptomatic.  Denies associated diaphoresis, shortness of breath, nausea, vomiting, abdominal pain. Pt. referred for [facility] for further evaluation  (09 Dec 2024 07:42)    T(C): 37.2 (12-09-24 @ 07:21), Max: 37.2 (12-09-24 @ 07:17)  HR: 67 (12-09-24 @ 10:05) (63 - 80)  BP: 146/73 (12-09-24 @ 10:05) (112/70 - 167/90)  RR: 18 (12-09-24 @ 10:05) (18 - 18)  SpO2: 97% (12-09-24 @ 10:05) (91% - 99%)  Wt(kg): --    PHYSICAL EXAM:  Neurologic: Non-focal, AxOx3.  No neuro deficits  Vascular: Peripheral pulses palpable 2+ bilaterally  Procedure Site: Rt. radial band in place site benign soft no bleeding no hematoma +1 radial pulse no c/o numbness/tingling, <3sec cap refill, fingers/hand warm to touch     LABS:	 	                        13.0   6.81  )-----------( 284      ( 09 Dec 2024 06:34 )             40.5   12-09    142  |  112[H]  |  21  ----------------------------<  113[H]  3.9   |  26  |  0.90    Ca    9.2      09 Dec 2024 06:34    TPro  7.1  /  Alb  3.8  /  TBili  0.3  /  DBili  x   /  AST  25  /  ALT  38  /  AlkPhos  62  12-09    PROCEDURE RESULTS:  S/P C S/P CHARLES x2 to LAD     ASSESSMENT/PLAN: 	  -Admit to CCU  -VS, labs, diet, activity as per PCI orders  -IV hydration  -Encourage PO fluids  -ASA 81mg  -Plavix 75mg  -Crestor 20mg  -Lopressor 25mg BID   -Losartan 25mg   -Pt. qualifies for cardiac rehab, educational material provided to patient pt. accepted  -Sedation instructions reviewed with patient   -Plan of care D/W pt. and MD  -Discussed therapeutic lifestyle changes to reduce risk factors such as following a cardiac diet, weight loss, maintaining a healthy weight, exercise, smoking cessation, medication compliance, and regular follow-up  with MD to know our numbers (BP, cholesterol, weight, and glucose  -If pt. remains stable overnight possible D/C in AM  - Follow-up AM labs/EKG/site check  -Follow-up with attending/cardiologist

## 2024-12-09 NOTE — H&P ADULT - NSHPLABSRESULTS_GEN_ALL_CORE
13.0   6.81  )-----------( 284      ( 09 Dec 2024 06:34 )             40.5     12-09    142  |  112[H]  |  21  ----------------------------<  113[H]  3.9   |  26  |  0.90    Ca    9.2      09 Dec 2024 06:34    TPro  7.1  /  Alb  3.8  /  TBili  0.3  /  DBili  x   /  AST  25  /  ALT  38  /  AlkPhos  62  12-09

## 2024-12-09 NOTE — ED PROVIDER NOTE - OBJECTIVE STATEMENT
79-year-old female past ministry of hyperlipidemia, hypothyroidism presents ED sent in by cardiologist Dr. Hamilton for cardiac catheterization.  Patient's been having exertional chest pain intermittently for the last 2 weeks and had abnormal troponins as an outpatient.  Patient is currently asymptomatic.  Denies associated diaphoresis, shortness of breath, nausea, vomiting, abdominal pain.

## 2024-12-09 NOTE — ED ADULT TRIAGE NOTE - CHIEF COMPLAINT QUOTE
Patient ambulatory to the ER c/o intermittent substernal chest pain starting 2 weeks ago. Endorsing SOB when the chest pain happens; no chest pain in triage. Patient saw Dr. Chiu on Friday and had a normal ekg but the labs were abnormal. Patient instructed to come to the ER and ask for Dr. Hamilton who is expecting her. Taken for stat ekg

## 2024-12-10 ENCOUNTER — TRANSCRIPTION ENCOUNTER (OUTPATIENT)
Age: 79
End: 2024-12-10

## 2024-12-10 VITALS — TEMPERATURE: 97 F

## 2024-12-10 LAB
ANION GAP SERPL CALC-SCNC: 2 MMOL/L — LOW (ref 5–17)
BASOPHILS # BLD AUTO: 0.04 K/UL — SIGNIFICANT CHANGE UP (ref 0–0.2)
BASOPHILS NFR BLD AUTO: 0.4 % — SIGNIFICANT CHANGE UP (ref 0–2)
BUN SERPL-MCNC: 16 MG/DL — SIGNIFICANT CHANGE UP (ref 7–23)
CALCIUM SERPL-MCNC: 9.2 MG/DL — SIGNIFICANT CHANGE UP (ref 8.5–10.1)
CHLORIDE SERPL-SCNC: 111 MMOL/L — HIGH (ref 96–108)
CO2 SERPL-SCNC: 28 MMOL/L — SIGNIFICANT CHANGE UP (ref 22–31)
CREAT SERPL-MCNC: 0.86 MG/DL — SIGNIFICANT CHANGE UP (ref 0.5–1.3)
EGFR: 68 ML/MIN/1.73M2 — SIGNIFICANT CHANGE UP
EOSINOPHIL # BLD AUTO: 0.05 K/UL — SIGNIFICANT CHANGE UP (ref 0–0.5)
EOSINOPHIL NFR BLD AUTO: 0.5 % — SIGNIFICANT CHANGE UP (ref 0–6)
GLUCOSE SERPL-MCNC: 120 MG/DL — HIGH (ref 70–99)
HCT VFR BLD CALC: 40 % — SIGNIFICANT CHANGE UP (ref 34.5–45)
HGB BLD-MCNC: 13 G/DL — SIGNIFICANT CHANGE UP (ref 11.5–15.5)
IMM GRANULOCYTES NFR BLD AUTO: 0.5 % — SIGNIFICANT CHANGE UP (ref 0–0.9)
LYMPHOCYTES # BLD AUTO: 2.6 K/UL — SIGNIFICANT CHANGE UP (ref 1–3.3)
LYMPHOCYTES # BLD AUTO: 24.3 % — SIGNIFICANT CHANGE UP (ref 13–44)
MCHC RBC-ENTMCNC: 28.4 PG — SIGNIFICANT CHANGE UP (ref 27–34)
MCHC RBC-ENTMCNC: 32.5 G/DL — SIGNIFICANT CHANGE UP (ref 32–36)
MCV RBC AUTO: 87.3 FL — SIGNIFICANT CHANGE UP (ref 80–100)
MONOCYTES # BLD AUTO: 1.06 K/UL — HIGH (ref 0–0.9)
MONOCYTES NFR BLD AUTO: 9.9 % — SIGNIFICANT CHANGE UP (ref 2–14)
NEUTROPHILS # BLD AUTO: 6.89 K/UL — SIGNIFICANT CHANGE UP (ref 1.8–7.4)
NEUTROPHILS NFR BLD AUTO: 64.4 % — SIGNIFICANT CHANGE UP (ref 43–77)
PLATELET # BLD AUTO: 254 K/UL — SIGNIFICANT CHANGE UP (ref 150–400)
POTASSIUM SERPL-MCNC: 4.6 MMOL/L — SIGNIFICANT CHANGE UP (ref 3.5–5.3)
POTASSIUM SERPL-SCNC: 4.6 MMOL/L — SIGNIFICANT CHANGE UP (ref 3.5–5.3)
RBC # BLD: 4.58 M/UL — SIGNIFICANT CHANGE UP (ref 3.8–5.2)
RBC # FLD: 14.1 % — SIGNIFICANT CHANGE UP (ref 10.3–14.5)
SODIUM SERPL-SCNC: 141 MMOL/L — SIGNIFICANT CHANGE UP (ref 135–145)
WBC # BLD: 10.69 K/UL — HIGH (ref 3.8–10.5)
WBC # FLD AUTO: 10.69 K/UL — HIGH (ref 3.8–10.5)

## 2024-12-10 PROCEDURE — 99232 SBSQ HOSP IP/OBS MODERATE 35: CPT

## 2024-12-10 PROCEDURE — 93010 ELECTROCARDIOGRAM REPORT: CPT

## 2024-12-10 RX ORDER — LATANOPROST 50 UG/ML
1 SOLUTION/ DROPS OPHTHALMIC
Refills: 0 | DISCHARGE

## 2024-12-10 RX ORDER — LOSARTAN POTASSIUM 100 MG/1
1 TABLET, FILM COATED ORAL
Qty: 90 | Refills: 1
Start: 2024-12-10 | End: 2025-06-07

## 2024-12-10 RX ORDER — UBIDECARENONE 100 %
1 POWDER (GRAM) MISCELLANEOUS
Refills: 0 | DISCHARGE

## 2024-12-10 RX ORDER — ROSUVASTATIN CALCIUM 5 MG/1
1 TABLET, FILM COATED ORAL
Refills: 0 | DISCHARGE

## 2024-12-10 RX ORDER — CLOPIDOGREL 75 MG/1
1 TABLET, FILM COATED ORAL
Qty: 90 | Refills: 3
Start: 2024-12-10 | End: 2025-12-04

## 2024-12-10 RX ADMIN — Medication 81 MILLIGRAM(S): at 10:51

## 2024-12-10 RX ADMIN — LOSARTAN POTASSIUM 50 MILLIGRAM(S): 100 TABLET, FILM COATED ORAL at 10:51

## 2024-12-10 RX ADMIN — CLOPIDOGREL 75 MILLIGRAM(S): 75 TABLET, FILM COATED ORAL at 10:52

## 2024-12-10 RX ADMIN — METOPROLOL TARTRATE 25 MILLIGRAM(S): 100 TABLET, FILM COATED ORAL at 10:52

## 2024-12-10 RX ADMIN — Medication 50 MICROGRAM(S): at 10:51

## 2024-12-10 NOTE — DISCHARGE NOTE NURSING/CASE MANAGEMENT/SOCIAL WORK - NSDCFUADDAPPT_GEN_ALL_CORE_FT
Follow up appt at Dr Hamilton office on 12/17/24 @12:30pm  [address] [facility]  [id number] [phone]

## 2024-12-10 NOTE — DISCHARGE NOTE PROVIDER - NSDCMRMEDTOKEN_GEN_ALL_CORE_FT
aspirin 81 mg oral tablet, chewable: 1 tab(s) orally once a day  clopidogrel 75 mg oral tablet: 1 tab(s) orally once a day  Co-Q10 100 mg oral capsule: 1 cap(s) orally once a day  latanoprost 0.005% ophthalmic solution: 1 drop(s) in each eye once a day (at bedtime)  levothyroxine 50 mcg (0.05 mg) oral tablet: 1 tab(s) orally once a day  losartan 50 mg oral tablet: 1 tab(s) orally once a day  metoprolol tartrate 25 mg oral tablet: 1 tab(s) orally 2 times a day  rosuvastatin 20 mg oral tablet: 1 tab(s) orally once a day (at bedtime)

## 2024-12-10 NOTE — DISCHARGE NOTE NURSING/CASE MANAGEMENT/SOCIAL WORK - PATIENT PORTAL LINK FT
You can access the FollowMyHealth Patient Portal offered by [facility] by registering at the following website: [email or web address]. By joining [email or web address] [email or web address] portal, you will also be able to view your health information using other applications (apps) compatible with our system.

## 2024-12-10 NOTE — PROGRESS NOTE ADULT - ASSESSMENT
ASSESSMENT/PLAN: 	  79-year-old female PMH of hyperlipidemia, hypothyroidism presents ED sent in by cardiologist in given elevated outpt troponin and symptoms of burning. S/P severe proximal LAD disease on cath, where received CHARLES x2 successfully. Small perforation in distal which improved its own at the end of the procedure. Echo with no pericardial effusion and Pt is asymptomatic and hemodynamically stable.   - continue ASA 81mg daily (Rx sent)   - continue Plavix 75mg daily (Rx sent)  - continue Crestor 20mg daily   - continue Lopressor 25mg BID   - start Losartan 25mg daily   - Cardiac rehab referral faxed as Pt is willing to participate  - Pt may discharge home today if remains stable    - follow up with Dr. Bell in 1-2 weeks as an outpt      Discussed the plan with Dr. Hamilton, Dr. Last, CCU staff and Pt.   
79-year-old female PMH of hyperlipidemia, hypothyroidism present sent to  by cardiologist Dr. Hamilton for cardiac catheterization due to exertional chest pain and elevated troponins seen as an outpatient.    Pt s/p LHC S/P CHARLES x2 to LAD on 12/9.    Plan:  - Mentation intact  - Hemodynamically stable, c/w ASA/plavix  - C/w crestor, lopressor and losartan  - On room air  - Regular diet  - Stable renal indices  - Monitor off abx  - ambulating around unit without difficulty  - Cardiac rehab  - F/u with Dr. Bell in 1-2 weeks as an outpt    Dispo: Pt medically stable for dc home, discussed with Dr. Hamilton, updated pt at bedside

## 2024-12-10 NOTE — PROGRESS NOTE ADULT - SUBJECTIVE AND OBJECTIVE BOX
INTERVENTIONAL CARDIOLOGY PROGRESS NOTE         HPI:   79-year-old female PMH of hyperlipidemia, hypothyroidism presents ED sent in by cardiologist Dr. Hamilton for cardiac catheterization.  Patient's been having exertional chest pain (burning) intermittently for the last 2 weeks and had abnormal troponin' s as an outpatient.  Patient is currently asymptomatic.  Denies associated diaphoresis, shortness of breath, nausea, vomiting, abdominal pain. Pt. referred for LHC for further evaluation     Pt underwent LHC via Rt radial access on 12/9/24, revealed severe proximal LAD disease, where received CHARLES x2. Concerning of small perforation in distal which improved its own at the end of the procedure. Echo with no pericardial effusion and Pt is asymptomatic. VSS. Pt stayed overnight for further monitoring.   12/10/24: Pt was seen in her room, no overnight events, denies chest pain, sob or palpitation. VSS, EKG with improving T wave inversion in leads V1-V3.     REVIEW OF SYSTEMS:  General: + anxious, denies fever, chills, fatigue   HEENT: no vision/ hearing changes, no sore throat or neck pain   Cardiovascular: denies chest pain, sob, palpitation   Pulmonary: denies SOB, coughing, productive sputum  GI: denies nausea/ vomiting, diarrhea/ constipation, gastric ulcer or bleeding   : denies urinary frequency, burning or pain   Neurology: denies dizziness, headache, light handedness pre-syncope or syncopal episode   Extremities: denies pain or edema   Skin: denies skin lesion, rashes or discharge     PHYSICAL EXAM:  T(C): 36.8 (12-09-24 @ 16:40), Max: 36.8 (12-09-24 @ 16:40)  HR: 72 (12-10-24 @ 07:00) (63 - 91)  BP: 141/72 (12-10-24 @ 07:00) (82/55 - 165/79)  RR: 19 (12-10-24 @ 07:00) (15 - 20)  SpO2: 100% (12-10-24 @ 07:00) (91% - 100%)  I&O's Summary    09 Dec 2024 07:01  -  10 Dec 2024 07:00  --------------------------------------------------------  IN: 970 mL / OUT: 1400 mL / NET: -430 mL    Appearance: Normal	  HEENT:   Normal oral mucosa, PERRL, EOMI	  Cardiovascular: Normal S1 S2, No JVD, No murmurs, No edema  Respiratory: Lungs clear to auscultation	  Psychiatry: A & O x 3, Mood & affect appropriate  Gastrointestinal:  Soft, Non-tender, + BS	  Skin: No rashes, No ecchymoses, No cyanosis	  Neurologic: Non-focal  Extremities: Normal range of motion, No clubbing, cyanosis or edema  Vascular: Peripheral pulses palpable 2+ bilaterally  Rt radial access: ecchymotic and slightly  in Rt forearm, no hematoma or bleeding in access area.     LABS:	 	  CBC Full  -  ( 10 Dec 2024 05:30 )  WBC Count : 10.69 K/uL  Hemoglobin : 13.0 g/dL  Hematocrit : 40.0 %  Platelet Count - Automated : 254 K/uL  Mean Cell Volume : 87.3 fl  Mean Cell Hemoglobin : 28.4 pg  Mean Cell Hemoglobin Concentration : 32.5 g/dL  Auto Neutrophil # : 6.89 K/uL  Auto Lymphocyte # : 2.60 K/uL  Auto Monocyte # : 1.06 K/uL  Auto Eosinophil # : 0.05 K/uL  Auto Basophil # : 0.04 K/uL  Auto Neutrophil % : 64.4 %  Auto Lymphocyte % : 24.3 %  Auto Monocyte % : 9.9 %  Auto Eosinophil % : 0.5 %  Auto Basophil % : 0.4 %    12-10    141  |  111[H]  |  16  ----------------------------<  120[H]  4.6   |  28  |  0.86  12-09    142  |  112[H]  |  21  ----------------------------<  113[H]  3.9   |  26  |  0.90    Ca    9.2      10 Dec 2024 05:30  Ca    9.2      09 Dec 2024 06:34    TPro  7.1  /  Alb  3.8  /  TBili  0.3  /  DBili  x   /  AST  25  /  ALT  38  /  AlkPhos  62  12-09  proBNP: Pro-Brain Natriuretic Peptide (12.09.24 @ 06:34)      Pro-Brain Natriuretic Peptide: 227 pg/mL    Troponin I, High Sensitivity 46.41 (12.09.24 @ 06:34)    Troponin I, High Sensitivity Result: 46.41: Serial measurements of high sensitivity troponin I (hs TnI) showing rapid  upward or downward changes suggest acute myocardial injury.  Please note  that a sustained elevation of hsTnI can be caused by renal disease,  chronic heart failure, sepsis, pulmonary embolism and other clinical  conditions.  High Sensitivity Troponin and New Male & Female Reference ranges in  effect as of 10/05/2021.     Female reference range < 53.7 ng/L     Male reference range <78.5 ng/L ng/L    TELEMETRY: SR-ST   ECG: NSR at 74 bpm, improving T wave inversion in lead V1-V3    RADIOLOGY:  OTHER: 	    PREVIOUS DIAGNOSTIC TESTING:    [x ] Echocardiogram:< from: TTE W or WO Ultrasound Enhancing Agent (12.09.24 @ 09:40) >  < from: TTE W or WO Ultrasound Enhancing Agent (12.09.24 @ 09:40) >  CONCLUSIONS:      1. Left ventricular cavity is small. Left ventricular systolic function is normal with an ejection fraction of 62 % by Yang's method of disks. There are no regional wall motion abnormalities seen.   2. Ascending aorta is normal in size, measuring 3.10 cm (indexed 1.80 cm/m²).   3. Mild aortic regurgitation.   4. No left ventricular hypertrophy.  < end of copied text >    [ ]  Catheterization: Pending official report   [ ] Stress Test:  	    MEDICATIONS:  aspirin  chewable 81 milliGRAM(s) Oral daily  clopidogrel Tablet 75 milliGRAM(s) Oral daily  losartan 50 milliGRAM(s) Oral daily  metoprolol tartrate 25 milliGRAM(s) Oral two times a day  aluminum hydroxide/magnesium hydroxide/simethicone Suspension 30 milliLiter(s) Oral every 4 hours PRN  levothyroxine 50 MICROGram(s) Oral daily  rosuvastatin 20 milliGRAM(s) Oral at bedtime

## 2024-12-10 NOTE — DISCHARGE NOTE NURSING/CASE MANAGEMENT/SOCIAL WORK - FINANCIAL ASSISTANCE
[facility] provides services at a reduced cost to those who are determined to be eligible through [facility]’s financial assistance program. Information regarding [facility]’s financial assistance program can be found by going to [email or web address] or by calling [phone].

## 2024-12-10 NOTE — DISCHARGE NOTE NURSING/CASE MANAGEMENT/SOCIAL WORK - NSDCPEFALRISK_GEN_ALL_CORE
For information on Fall & Injury Prevention, visit: [email or web address] OR  [email or web address] OR  https://www.cdc.gov/steadi/patient.html

## 2024-12-10 NOTE — DISCHARGE NOTE PROVIDER - CARE PROVIDER_API CALL
Rocky Bell  Cardiovascular Disease  [address] [place] [address]  Phone: [phone]  Fax: [phone]  Established Patient  Follow Up Time:

## 2024-12-10 NOTE — DISCHARGE NOTE PROVIDER - HOSPITAL COURSE
79-year-old female with PMHx of hyperlipidemia, hypothyroidism presents ED sent in by cardiologist in given elevated outpt troponin and symptoms of burning sensation. Severe proximal LAD disease on cath 12/9/24, where received CHARLES x2 successfully. Small perforation in distal which improved its own at the end of the procedure. Echo with no pericardial effusion. Pt is asymptomatic and hemodynamically stable.   - continue ASA 81mg daily (Rx sent)   - continue Plavix 75mg daily (Rx sent)  - continue Crestor 20mg daily   - continue Lopressor 25mg BID   - start Losartan 25mg daily (Rx sent)   - Cardiac rehab referral faxed as Pt is willing to participate  - Pt may discharge home today if remains stable    - follow up with Dr. Bell in 1-2 weeks as an outpt     Vital Signs Last 24 Hrs  T(C): 36.8 (09 Dec 2024 16:40), Max: 36.8 (09 Dec 2024 16:40)  T(F): 98.2 (09 Dec 2024 16:40), Max: 98.2 (09 Dec 2024 16:40)  HR: 72 (10 Dec 2024 07:00) (63 - 91)  BP: 141/72 (10 Dec 2024 07:00) (82/55 - 165/79)  BP(mean): 94 (10 Dec 2024 07:00) (63 - 110)  RR: 19 (10 Dec 2024 07:00) (15 - 20)  SpO2: 100% (10 Dec 2024 07:00) (91% - 100%)  Parameters below as of 09 Dec 2024 11:05  Patient On (Oxygen Delivery Method): room air    Appearance: Normal	  HEENT:   Normal oral mucosa, PERRL, EOMI	  Cardiovascular: Normal S1 S2, No JVD, No murmurs, No edema  Respiratory: Lungs clear to auscultation	  Psychiatry: A & O x 3, Mood & affect appropriate  Gastrointestinal:  Soft, Non-tender, + BS	  Skin: No rashes, No ecchymoses, No cyanosis	  Neurologic: Non-focal  Extremities: Normal range of motion, No clubbing, cyanosis or edema  Vascular: Peripheral pulses palpable 2+ bilaterally  Rt radial access: ecchymotic and slightly  in Rt forearm, no hematoma or bleeding in access area.

## 2024-12-10 NOTE — DISCHARGE NOTE PROVIDER - NSDCCPCAREPLAN_GEN_ALL_CORE_FT
PRINCIPAL DISCHARGE DIAGNOSIS  Diagnosis: CAD (coronary artery disease)  Assessment and Plan of Treatment: - start DAPT, ASA/ Plavix   - continue Statin      SECONDARY DISCHARGE DIAGNOSES  Diagnosis: Hypothyroidism  Assessment and Plan of Treatment: - continue Levothyroxin       Diagnosis: HTN (hypertension)  Assessment and Plan of Treatment: - continue Metoprolol 25 mg BID  - start Losartan 50 mg daily

## 2024-12-10 NOTE — PROGRESS NOTE ADULT - SUBJECTIVE AND OBJECTIVE BOX
OVERNIGHT EVENTS / SUBJECTIVE: Patient seen and examined at bedside.     OBJECTIVE:    VITAL SIGNS:  ICU Vital Signs Last 24 Hrs  T(C): 36.8 (09 Dec 2024 16:40), Max: 36.8 (09 Dec 2024 16:40)  T(F): 98.2 (09 Dec 2024 16:40), Max: 98.2 (09 Dec 2024 16:40)  HR: 72 (10 Dec 2024 07:00) (63 - 91)  BP: 141/72 (10 Dec 2024 07:00) (82/55 - 165/79)  BP(mean): 94 (10 Dec 2024 07:00) (63 - 110)  ABP: --  ABP(mean): --  RR: 19 (10 Dec 2024 07:00) (15 - 20)  SpO2: 100% (10 Dec 2024 07:00) (91% - 100%)    O2 Parameters below as of 09 Dec 2024 11:05  Patient On (Oxygen Delivery Method): room air              12-09 @ 07:01  -  12-10 @ 07:00  --------------------------------------------------------  IN: 970 mL / OUT: 1400 mL / NET: -430 mL      CAPILLARY BLOOD GLUCOSE          PHYSICAL EXAM:    General: NAD  HEENT: NC/AT; PERRL, clear conjunctiva  Neck: supple  Respiratory: CTA b/l  Cardiovascular: +S1/S2; RRR  Abdomen: soft, NT/ND; +BS x4  Extremities: WWP, 2+ peripheral pulses b/l; no LE edema  Skin: normal color and turgor; no rash  Neurological: A&Ox3 no focal deficits    MEDICATIONS:  MEDICATIONS  (STANDING):  aspirin  chewable 81 milliGRAM(s) Oral daily  clopidogrel Tablet 75 milliGRAM(s) Oral daily  levothyroxine 50 MICROGram(s) Oral daily  losartan 50 milliGRAM(s) Oral daily  metoprolol tartrate 25 milliGRAM(s) Oral two times a day  rosuvastatin 20 milliGRAM(s) Oral at bedtime    MEDICATIONS  (PRN):  aluminum hydroxide/magnesium hydroxide/simethicone Suspension 30 milliLiter(s) Oral every 4 hours PRN Dyspepsia      ALLERGIES:  Allergies    levofloxacin (Unknown)  penicillin (Unknown)  clarithromycin (Unknown)    Intolerances        LABS:                        13.0   10.69 )-----------( 254      ( 10 Dec 2024 05:30 )             40.0     Hemoglobin: 13.0 g/dL (12-10 @ 05:30)  Hemoglobin: 13.0 g/dL (12-09 @ 06:34)    CBC Full  -  ( 10 Dec 2024 05:30 )  WBC Count : 10.69 K/uL  RBC Count : 4.58 M/uL  Hemoglobin : 13.0 g/dL  Hematocrit : 40.0 %  Platelet Count - Automated : 254 K/uL  Mean Cell Volume : 87.3 fl  Mean Cell Hemoglobin : 28.4 pg  Mean Cell Hemoglobin Concentration : 32.5 g/dL  Auto Neutrophil # : 6.89 K/uL  Auto Lymphocyte # : 2.60 K/uL  Auto Monocyte # : 1.06 K/uL  Auto Eosinophil # : 0.05 K/uL  Auto Basophil # : 0.04 K/uL  Auto Neutrophil % : 64.4 %  Auto Lymphocyte % : 24.3 %  Auto Monocyte % : 9.9 %  Auto Eosinophil % : 0.5 %  Auto Basophil % : 0.4 %    12-10    141  |  111[H]  |  16  ----------------------------<  120[H]  4.6   |  28  |  0.86    Ca    9.2      10 Dec 2024 05:30    TPro  7.1  /  Alb  3.8  /  TBili  0.3  /  DBili  x   /  AST  25  /  ALT  38  /  AlkPhos  62  12-09    Creatinine Trend: 0.86<--, 0.90<--  LIVER FUNCTIONS - ( 09 Dec 2024 06:34 )  Alb: 3.8 g/dL / Pro: 7.1 gm/dL / ALK PHOS: 62 U/L / ALT: 38 U/L / AST: 25 U/L / GGT: x           PT/INR - ( 09 Dec 2024 06:34 )   PT: 10.7 sec;   INR: 0.93 ratio         PTT - ( 09 Dec 2024 06:34 )  PTT:29.1 sec    hs Troponin:            Urinalysis Basic - ( 10 Dec 2024 05:30 )    Color: x / Appearance: x / SG: x / pH: x  Gluc: 120 mg/dL / Ketone: x  / Bili: x / Urobili: x   Blood: x / Protein: x / Nitrite: x   Leuk Esterase: x / RBC: x / WBC x   Sq Epi: x / Non Sq Epi: x / Bacteria: x      CSF:                      EKG:   MICROBIOLOGY:    IMAGING:      Labs, imaging, EKG personally reviewed    RADIOLOGY & ADDITIONAL TESTS: Reviewed. OVERNIGHT EVENTS / SUBJECTIVE: Patient seen and examined at bedside.     No acute events overnight. Pt denies CP, SOB or other complaints this AM.    OBJECTIVE:    VITAL SIGNS:  ICU Vital Signs Last 24 Hrs  T(C): 36.8 (09 Dec 2024 16:40), Max: 36.8 (09 Dec 2024 16:40)  T(F): 98.2 (09 Dec 2024 16:40), Max: 98.2 (09 Dec 2024 16:40)  HR: 72 (10 Dec 2024 07:00) (63 - 91)  BP: 141/72 (10 Dec 2024 07:00) (82/55 - 165/79)  BP(mean): 94 (10 Dec 2024 07:00) (63 - 110)  ABP: --  ABP(mean): --  RR: 19 (10 Dec 2024 07:00) (15 - 20)  SpO2: 100% (10 Dec 2024 07:00) (91% - 100%)    O2 Parameters below as of 09 Dec 2024 11:05  Patient On (Oxygen Delivery Method): room air              12-09 @ 07:01  -  12-10 @ 07:00  --------------------------------------------------------  IN: 970 mL / OUT: 1400 mL / NET: -430 mL      CAPILLARY BLOOD GLUCOSE          PHYSICAL EXAM:    General: NAD  HEENT: NC/AT; PERRL, clear conjunctiva  Neck: supple  Respiratory: CTA b/l  Cardiovascular: +S1/S2; RRR  Abdomen: soft, NT/ND; +BS x4  Extremities: WWP, 2+ peripheral pulses b/l; no LE edema  Skin: normal color and turgor; no rash  Neurological: A&Ox3 no focal deficits    MEDICATIONS:  MEDICATIONS  (STANDING):  aspirin  chewable 81 milliGRAM(s) Oral daily  clopidogrel Tablet 75 milliGRAM(s) Oral daily  levothyroxine 50 MICROGram(s) Oral daily  losartan 50 milliGRAM(s) Oral daily  metoprolol tartrate 25 milliGRAM(s) Oral two times a day  rosuvastatin 20 milliGRAM(s) Oral at bedtime    MEDICATIONS  (PRN):  aluminum hydroxide/magnesium hydroxide/simethicone Suspension 30 milliLiter(s) Oral every 4 hours PRN Dyspepsia      ALLERGIES:  Allergies    levofloxacin (Unknown)  penicillin (Unknown)  clarithromycin (Unknown)    Intolerances        LABS:                        13.0   10.69 )-----------( 254      ( 10 Dec 2024 05:30 )             40.0     Hemoglobin: 13.0 g/dL (12-10 @ 05:30)  Hemoglobin: 13.0 g/dL (12-09 @ 06:34)    CBC Full  -  ( 10 Dec 2024 05:30 )  WBC Count : 10.69 K/uL  RBC Count : 4.58 M/uL  Hemoglobin : 13.0 g/dL  Hematocrit : 40.0 %  Platelet Count - Automated : 254 K/uL  Mean Cell Volume : 87.3 fl  Mean Cell Hemoglobin : 28.4 pg  Mean Cell Hemoglobin Concentration : 32.5 g/dL  Auto Neutrophil # : 6.89 K/uL  Auto Lymphocyte # : 2.60 K/uL  Auto Monocyte # : 1.06 K/uL  Auto Eosinophil # : 0.05 K/uL  Auto Basophil # : 0.04 K/uL  Auto Neutrophil % : 64.4 %  Auto Lymphocyte % : 24.3 %  Auto Monocyte % : 9.9 %  Auto Eosinophil % : 0.5 %  Auto Basophil % : 0.4 %    12-10    141  |  111[H]  |  16  ----------------------------<  120[H]  4.6   |  28  |  0.86    Ca    9.2      10 Dec 2024 05:30    TPro  7.1  /  Alb  3.8  /  TBili  0.3  /  DBili  x   /  AST  25  /  ALT  38  /  AlkPhos  62  12-09    Creatinine Trend: 0.86<--, 0.90<--  LIVER FUNCTIONS - ( 09 Dec 2024 06:34 )  Alb: 3.8 g/dL / Pro: 7.1 gm/dL / ALK PHOS: 62 U/L / ALT: 38 U/L / AST: 25 U/L / GGT: x           PT/INR - ( 09 Dec 2024 06:34 )   PT: 10.7 sec;   INR: 0.93 ratio         PTT - ( 09 Dec 2024 06:34 )  PTT:29.1 sec    hs Troponin:            Urinalysis Basic - ( 10 Dec 2024 05:30 )    Color: x / Appearance: x / SG: x / pH: x  Gluc: 120 mg/dL / Ketone: x  / Bili: x / Urobili: x   Blood: x / Protein: x / Nitrite: x   Leuk Esterase: x / RBC: x / WBC x   Sq Epi: x / Non Sq Epi: x / Bacteria: x      CSF:                      EKG:   MICROBIOLOGY:    IMAGING:      Labs, imaging, EKG personally reviewed    RADIOLOGY & ADDITIONAL TESTS: Reviewed.

## 2024-12-18 NOTE — POST DISCHARGE NOTE - DETAILS:
Post procedure phone call completed; patient understood all discharge paperwork. No questions regarding medications or pain management. MD follow up appointment made. Patient was able to rest when they were discharged. Patient will recommend [facility], no complaints of hospital stay, satisfied with care. Instructed patient to contact provider with any further questions or concerns.

## 2025-04-14 ENCOUNTER — APPOINTMENT (OUTPATIENT)
Dept: URBAN - METROPOLITAN AREA CLINIC 333 | Facility: CLINIC | Age: 80
Setting detail: DERMATOLOGY
End: 2025-04-14

## 2025-04-14 DIAGNOSIS — Z02.9 ENCOUNTER FOR ADMINISTRATIVE EXAMINATIONS, UNSPECIFIED: ICD-10-CM

## 2025-04-14 PROCEDURE — ? ADDITIONAL NOTES

## 2025-04-14 NOTE — PROCEDURE: ADDITIONAL NOTES
Render Risk Assessment In Note?: no
Additional Notes: Patient was early to skin check. Did not need to be seen
Detail Level: Simple

## 2025-06-04 ENCOUNTER — NON-APPOINTMENT (OUTPATIENT)
Age: 80
End: 2025-06-04